# Patient Record
Sex: FEMALE | Race: WHITE | NOT HISPANIC OR LATINO | Employment: OTHER | URBAN - METROPOLITAN AREA
[De-identification: names, ages, dates, MRNs, and addresses within clinical notes are randomized per-mention and may not be internally consistent; named-entity substitution may affect disease eponyms.]

---

## 2023-03-21 LAB
ALANINE AMINOTRANSFERASE (SGPT) (U/L) IN SER/PLAS: 23 U/L (ref 7–45)
ALBUMIN (G/DL) IN SER/PLAS: 4.6 G/DL (ref 3.4–5)
ALKALINE PHOSPHATASE (U/L) IN SER/PLAS: 84 U/L (ref 33–136)
ANION GAP IN SER/PLAS: 13 MMOL/L (ref 10–20)
APPEARANCE, URINE: NORMAL
ASCORBIC ACID: NORMAL MG/DL
ASPARTATE AMINOTRANSFERASE (SGOT) (U/L) IN SER/PLAS: 26 U/L (ref 9–39)
BASOPHILS (10*3/UL) IN BLOOD BY AUTOMATED COUNT: 0.04 X10E9/L (ref 0–0.1)
BASOPHILS/100 LEUKOCYTES IN BLOOD BY AUTOMATED COUNT: 0.7 % (ref 0–2)
BILIRUBIN TOTAL (MG/DL) IN SER/PLAS: 0.6 MG/DL (ref 0–1.2)
BILIRUBIN, URINE: NORMAL
BLOOD, URINE: NORMAL
CALCIDIOL (25 OH VITAMIN D3) (NG/ML) IN SER/PLAS: 47 NG/ML
CALCIUM (MG/DL) IN SER/PLAS: 10.3 MG/DL (ref 8.6–10.6)
CARBON DIOXIDE, TOTAL (MMOL/L) IN SER/PLAS: 28 MMOL/L (ref 21–32)
CHLORIDE (MMOL/L) IN SER/PLAS: 104 MMOL/L (ref 98–107)
CHOLESTEROL (MG/DL) IN SER/PLAS: 231 MG/DL (ref 0–199)
CHOLESTEROL IN HDL (MG/DL) IN SER/PLAS: 63.9 MG/DL
CHOLESTEROL/HDL RATIO: 3.6
COLOR, URINE: NORMAL
CREATININE (MG/DL) IN SER/PLAS: 0.93 MG/DL (ref 0.5–1.05)
EOSINOPHILS (10*3/UL) IN BLOOD BY AUTOMATED COUNT: 0.08 X10E9/L (ref 0–0.4)
EOSINOPHILS/100 LEUKOCYTES IN BLOOD BY AUTOMATED COUNT: 1.3 % (ref 0–6)
ERYTHROCYTE DISTRIBUTION WIDTH (RATIO) BY AUTOMATED COUNT: 13.9 % (ref 11.5–14.5)
ERYTHROCYTE MEAN CORPUSCULAR HEMOGLOBIN CONCENTRATION (G/DL) BY AUTOMATED: 32 G/DL (ref 32–36)
ERYTHROCYTE MEAN CORPUSCULAR VOLUME (FL) BY AUTOMATED COUNT: 87 FL (ref 80–100)
ERYTHROCYTES (10*6/UL) IN BLOOD BY AUTOMATED COUNT: 5.29 X10E12/L (ref 4–5.2)
GFR FEMALE: 64 ML/MIN/1.73M2
GLUCOSE (MG/DL) IN SER/PLAS: 88 MG/DL (ref 74–99)
GLUCOSE, URINE: NORMAL
HEMATOCRIT (%) IN BLOOD BY AUTOMATED COUNT: 46.2 % (ref 36–46)
HEMOGLOBIN (G/DL) IN BLOOD: 14.8 G/DL (ref 12–16)
IMMATURE GRANULOCYTES/100 LEUKOCYTES IN BLOOD BY AUTOMATED COUNT: 0.2 % (ref 0–0.9)
KETONES, URINE: NORMAL
LDL: 150 MG/DL (ref 0–99)
LEUKOCYTE ESTERASE, URINE: NORMAL
LEUKOCYTES (10*3/UL) IN BLOOD BY AUTOMATED COUNT: 6 X10E9/L (ref 4.4–11.3)
LYMPHOCYTES (10*3/UL) IN BLOOD BY AUTOMATED COUNT: 1.23 X10E9/L (ref 0.8–3)
LYMPHOCYTES/100 LEUKOCYTES IN BLOOD BY AUTOMATED COUNT: 20.6 % (ref 13–44)
MONOCYTES (10*3/UL) IN BLOOD BY AUTOMATED COUNT: 0.63 X10E9/L (ref 0.05–0.8)
MONOCYTES/100 LEUKOCYTES IN BLOOD BY AUTOMATED COUNT: 10.6 % (ref 2–10)
NEUTROPHILS (10*3/UL) IN BLOOD BY AUTOMATED COUNT: 3.97 X10E9/L (ref 1.6–5.5)
NEUTROPHILS/100 LEUKOCYTES IN BLOOD BY AUTOMATED COUNT: 66.6 % (ref 40–80)
NITRITE, URINE: NORMAL
NRBC (PER 100 WBCS) BY AUTOMATED COUNT: 0 /100 WBC (ref 0–0)
PH, URINE: NORMAL
PLATELETS (10*3/UL) IN BLOOD AUTOMATED COUNT: 237 X10E9/L (ref 150–450)
POTASSIUM (MMOL/L) IN SER/PLAS: 4.4 MMOL/L (ref 3.5–5.3)
PROTEIN TOTAL: 7.1 G/DL (ref 6.4–8.2)
PROTEIN, URINE: NORMAL
SODIUM (MMOL/L) IN SER/PLAS: 141 MMOL/L (ref 136–145)
SPECIFIC GRAVITY, URINE: NORMAL
THYROTROPIN (MIU/L) IN SER/PLAS BY DETECTION LIMIT <= 0.05 MIU/L: 1.88 MIU/L (ref 0.44–3.98)
TRIGLYCERIDE (MG/DL) IN SER/PLAS: 85 MG/DL (ref 0–149)
UREA NITROGEN (MG/DL) IN SER/PLAS: 16 MG/DL (ref 6–23)
UROBILINOGEN, URINE: NORMAL
VLDL: 17 MG/DL (ref 0–40)

## 2023-07-21 LAB
ALANINE AMINOTRANSFERASE (SGPT) (U/L) IN SER/PLAS: 20 U/L (ref 7–45)
ALBUMIN (G/DL) IN SER/PLAS: 4.7 G/DL (ref 3.4–5)
ALKALINE PHOSPHATASE (U/L) IN SER/PLAS: 72 U/L (ref 33–136)
ANION GAP IN SER/PLAS: 18 MMOL/L (ref 10–20)
ASPARTATE AMINOTRANSFERASE (SGOT) (U/L) IN SER/PLAS: 27 U/L (ref 9–39)
BILIRUBIN TOTAL (MG/DL) IN SER/PLAS: 0.5 MG/DL (ref 0–1.2)
CALCIDIOL (25 OH VITAMIN D3) (NG/ML) IN SER/PLAS: 48 NG/ML
CALCIUM (MG/DL) IN SER/PLAS: 10.2 MG/DL (ref 8.6–10.6)
CARBON DIOXIDE, TOTAL (MMOL/L) IN SER/PLAS: 27 MMOL/L (ref 21–32)
CHLORIDE (MMOL/L) IN SER/PLAS: 102 MMOL/L (ref 98–107)
CHOLESTEROL (MG/DL) IN SER/PLAS: 205 MG/DL (ref 0–199)
CHOLESTEROL IN HDL (MG/DL) IN SER/PLAS: 61 MG/DL
CHOLESTEROL/HDL RATIO: 3.4
CREATININE (MG/DL) IN SER/PLAS: 0.85 MG/DL (ref 0.5–1.05)
GFR FEMALE: 71 ML/MIN/1.73M2
GLUCOSE (MG/DL) IN SER/PLAS: 69 MG/DL (ref 74–99)
LDL: 128 MG/DL (ref 0–99)
POTASSIUM (MMOL/L) IN SER/PLAS: 4.6 MMOL/L (ref 3.5–5.3)
PROTEIN TOTAL: 6.8 G/DL (ref 6.4–8.2)
SODIUM (MMOL/L) IN SER/PLAS: 142 MMOL/L (ref 136–145)
THYROTROPIN (MIU/L) IN SER/PLAS BY DETECTION LIMIT <= 0.05 MIU/L: 2.31 MIU/L (ref 0.44–3.98)
TRIGLYCERIDE (MG/DL) IN SER/PLAS: 82 MG/DL (ref 0–149)
UREA NITROGEN (MG/DL) IN SER/PLAS: 20 MG/DL (ref 6–23)
VLDL: 16 MG/DL (ref 0–40)

## 2023-11-17 ENCOUNTER — OFFICE VISIT (OUTPATIENT)
Dept: PRIMARY CARE | Facility: CLINIC | Age: 73
End: 2023-11-17
Payer: MEDICARE

## 2023-11-17 ENCOUNTER — LAB (OUTPATIENT)
Dept: LAB | Facility: LAB | Age: 73
End: 2023-11-17
Payer: MEDICARE

## 2023-11-17 VITALS
WEIGHT: 166 LBS | SYSTOLIC BLOOD PRESSURE: 118 MMHG | RESPIRATION RATE: 16 BRPM | BODY MASS INDEX: 28.34 KG/M2 | DIASTOLIC BLOOD PRESSURE: 74 MMHG | HEIGHT: 64 IN

## 2023-11-17 DIAGNOSIS — E78.00 ELEVATED LDL CHOLESTEROL LEVEL: ICD-10-CM

## 2023-11-17 DIAGNOSIS — R82.90 ABNORMAL URINE FINDINGS: Primary | ICD-10-CM

## 2023-11-17 DIAGNOSIS — E55.9 VITAMIN D DEFICIENCY: ICD-10-CM

## 2023-11-17 DIAGNOSIS — E78.00 ELEVATED LDL CHOLESTEROL LEVEL: Primary | ICD-10-CM

## 2023-11-17 LAB
25(OH)D3 SERPL-MCNC: 61 NG/ML (ref 30–100)
ALBUMIN SERPL BCP-MCNC: 4.5 G/DL (ref 3.4–5)
ALP SERPL-CCNC: 77 U/L (ref 33–136)
ALT SERPL W P-5'-P-CCNC: 19 U/L (ref 7–45)
ANION GAP SERPL CALC-SCNC: 14 MMOL/L (ref 10–20)
APPEARANCE UR: ABNORMAL
AST SERPL W P-5'-P-CCNC: 20 U/L (ref 9–39)
BILIRUB SERPL-MCNC: 0.5 MG/DL (ref 0–1.2)
BILIRUB UR STRIP.AUTO-MCNC: NEGATIVE MG/DL
BUN SERPL-MCNC: 18 MG/DL (ref 6–23)
CALCIUM SERPL-MCNC: 10.3 MG/DL (ref 8.6–10.6)
CHLORIDE SERPL-SCNC: 104 MMOL/L (ref 98–107)
CHOLEST SERPL-MCNC: 216 MG/DL (ref 0–199)
CHOLESTEROL/HDL RATIO: 3.8
CO2 SERPL-SCNC: 28 MMOL/L (ref 21–32)
COLOR UR: YELLOW
CREAT SERPL-MCNC: 0.93 MG/DL (ref 0.5–1.05)
GFR SERPL CREATININE-BSD FRML MDRD: 64 ML/MIN/1.73M*2
GLUCOSE SERPL-MCNC: 90 MG/DL (ref 74–99)
GLUCOSE UR STRIP.AUTO-MCNC: NEGATIVE MG/DL
HDLC SERPL-MCNC: 56.3 MG/DL
KETONES UR STRIP.AUTO-MCNC: NEGATIVE MG/DL
LDLC SERPL CALC-MCNC: 144 MG/DL
LEUKOCYTE ESTERASE UR QL STRIP.AUTO: ABNORMAL
MUCOUS THREADS #/AREA URNS AUTO: ABNORMAL /LPF
NITRITE UR QL STRIP.AUTO: NEGATIVE
NON HDL CHOLESTEROL: 160 MG/DL (ref 0–149)
PH UR STRIP.AUTO: 7 [PH]
POTASSIUM SERPL-SCNC: 4.2 MMOL/L (ref 3.5–5.3)
PROT SERPL-MCNC: 7.1 G/DL (ref 6.4–8.2)
PROT UR STRIP.AUTO-MCNC: NEGATIVE MG/DL
RBC # UR STRIP.AUTO: NEGATIVE /UL
RBC #/AREA URNS AUTO: ABNORMAL /HPF
SODIUM SERPL-SCNC: 142 MMOL/L (ref 136–145)
SP GR UR STRIP.AUTO: 1.02
SQUAMOUS #/AREA URNS AUTO: ABNORMAL /HPF
TRIGL SERPL-MCNC: 81 MG/DL (ref 0–149)
UROBILINOGEN UR STRIP.AUTO-MCNC: <2 MG/DL
VLDL: 16 MG/DL (ref 0–40)
WBC #/AREA URNS AUTO: ABNORMAL /HPF

## 2023-11-17 PROCEDURE — 36415 COLL VENOUS BLD VENIPUNCTURE: CPT

## 2023-11-17 PROCEDURE — 99214 OFFICE O/P EST MOD 30 MIN: CPT | Performed by: INTERNAL MEDICINE

## 2023-11-17 PROCEDURE — 80061 LIPID PANEL: CPT

## 2023-11-17 PROCEDURE — 1159F MED LIST DOCD IN RCRD: CPT | Performed by: INTERNAL MEDICINE

## 2023-11-17 PROCEDURE — 81001 URINALYSIS AUTO W/SCOPE: CPT

## 2023-11-17 PROCEDURE — 82306 VITAMIN D 25 HYDROXY: CPT

## 2023-11-17 PROCEDURE — 80053 COMPREHEN METABOLIC PANEL: CPT

## 2023-11-17 NOTE — PROGRESS NOTES
Subjective   Patient ID: Yumiko Camilo is a 73 y.o. female who presents for Follow-up.    HPI  Patient in for a visit  Scheduled pre production June July ,the writer in [place] and other in [place] scripts keep changing [employer]  [employer] in August ,   Optivia all natural made by a doctor, vitamins daily ,has lost 31 lbs at home startedat 199 lbs  Review of Systems  General: Denies fever, chills, night sweats,  Eyes: Negative for recent visual changes  Ears, Nose, Throat :  Negative for hearing changes, sinus discomfort  Dermatologic: Negative for new skin conditions, rash  Respiratory: Negative for wheezing, shortness of breath, cough  Cardiovascular: Negative for chest pain, palpitations, or leg swelling  Gastrointestinal: Negative for nausea/vomiting, abdominal pain, changes in bowel habits  Genitourinary NEGATIVE FOR URINARY INCONTINENCE urgency , frequency, discomfort   Musculoskeletal: see hpi  Neurological: Negative for headaches, dizziness    Previous history  Past Medical History:   Diagnosis Date    Asymptomatic menopausal state     Asymptomatic menopausal state    Cellulitis, unspecified 02/09/2016    Cellulitis    Contact with and (suspected) exposure to other hazardous, chiefly nonmedicinal, chemicals     Exposure to chemical irritant    Disorder of external ear, unspecified, unspecified ear 03/07/2022    Disorder of ear lobe    Disorder of the skin and subcutaneous tissue, unspecified     Foot lesion    Encounter for general adult medical examination without abnormal findings 03/03/2022    Encounter for Medicare annual wellness exam    Encounter for gynecological examination (general) (routine) without abnormal findings 04/25/2016    Well woman exam    Encounter for immunization 09/16/2020    Needs flu shot    Encounter for screening for malignant neoplasm of cervix 04/25/2016    Screening for cervical cancer    Fall (on)(from) sidewalk curb, initial encounter     Fall on or from sidewalk curb    Fracture of  unspecified part of left clavicle, initial encounter for closed fracture     Closed left clavicular fracture    Mammographic calcification found on diagnostic imaging of breast 2015    Breast calcification, right    Mixed hyperlipidemia 2022    Combined hyperlipidemia    Obesity, unspecified 2021    Obesity    Other abnormal and inconclusive findings on diagnostic imaging of breast 2015    Mammogram abnormal    Other conditions influencing health status 2016    Postoperative seroma    Other injury of unspecified body region, initial encounter 2022    Wound of skin    Other specified disorders of bone density and structure, unspecified site 2021    Osteopenia    Other specified personal risk factors, not elsewhere classified 2017    History of exposure    Pain in left shoulder 2021    Left shoulder pain    Personal history of (healed) traumatic fracture     History of fracture of toe    Personal history of malignant neoplasm of breast 2016    History of malignant neoplasm of breast    Personal history of other specified conditions 2017    History of fatigue    Personal history of traumatic brain injury     History of concussion    Post-traumatic stress disorder, unspecified     PTSD (post-traumatic stress disorder)    Unspecified contact dermatitis due to other chemical products 2018    Latex allergy, contact dermatitis    Unspecified contact dermatitis, unspecified cause 2022    Contact dermatitis     Past Surgical History:   Procedure Laterality Date    BREAST BIOPSY  2018    Biopsy Breast Open    CATARACT EXTRACTION  2018    Cataract Surgery    MASTECTOMY, PARTIAL  2018    Right Breast Partial Mastectomy    MOUTH SURGERY  2018    Oral Surgery Tooth Extraction    OTHER SURGICAL HISTORY  2018    Surgically Induced  - By Dilation And Evacuation    SENTINEL LYMPH NODE BIOPSY  2018    [place] Lymph  Node Biopsy        No family history on file.  Not on File  No current outpatient medications    Objective       Physical Exam    Vital Signs: as recorded above  General: Well groomed, well nourished   Orientation:  Alert , oriented to time, place , and person   Mood and Affect:  Cooperative , no apparent distress normal affect  Skin: Good color, good turgor  Eyes: Extra ocular muscle movements intact, anicteric sclerae  Neck: Supple, full range of movement  Chest: Normal breath sounds, normal chest wall exam, symmetric, good air entry, clear to auscultation  Heart: Regular rate and rhythm, without murmur, gallop, or rubs  BACK:  no CTLS spine tenderness, no flank tenderness  Extremities: full range of movement  bilateral UE and bilateral LE,  no lower extremity edema  Neurological: Alert, oriented, cranial nerves II-XII intact except for visual acuity  Sensation:  Intact   Gait: normal steady      Assessment/Plan   Yumiko Camilo is a 73 y.o. female who presents for the concerns below:    Problem List Items Addressed This Visit    None    HYPERCHOLESTEROLEMIA PLAN:  will recheck with the weight loss  continue current medications  Follow low cholesterol diet, encouraged high omega 3 fatty acid intake in diet, exercise, weight control. . Will Obtain AST/ALT, fasting lipid profile, creatine kinase  on statin if not done within past 3-6 months . Coenzyme Q10 200 mg a day if able to help increase HDL.    Bmi 28     VITAMIN D DEFICIENCY low levels ,will put in order for high dose weekly 50,000 IU , then take Vitamin D3 2000 iu daily after 3 month course,   recheck vitamin D level in 4-5 months if below 26 will restart high dose again for another course , discussed other supplements or medications that may go through liver  follow-up as planned         Discussed with:   Return in : 6 months sooner pending lab results     Portions of this note were generated using digital voice recognition software, and may contain  grammatical errors       Babar Luna MD  11/17/23  9:34 AM

## 2023-11-20 ENCOUNTER — TELEPHONE (OUTPATIENT)
Dept: PRIMARY CARE | Facility: CLINIC | Age: 73
End: 2023-11-20
Payer: MEDICARE

## 2023-11-20 DIAGNOSIS — E78.5 HYPERLIPIDEMIA, UNSPECIFIED HYPERLIPIDEMIA TYPE: Primary | ICD-10-CM

## 2023-11-20 NOTE — TELEPHONE ENCOUNTER
Patient called and wanted to discuss her abnormal lab tests shital on 11/17      Spoke with pt no urinary sxs  Stricter lower fat diet , switch to almond or oat milk  Recheck in two months

## 2023-11-22 ENCOUNTER — LAB (OUTPATIENT)
Dept: LAB | Facility: LAB | Age: 73
End: 2023-11-22
Payer: MEDICARE

## 2023-11-22 ENCOUNTER — TELEPHONE (OUTPATIENT)
Dept: PRIMARY CARE | Facility: CLINIC | Age: 73
End: 2023-11-22

## 2023-11-22 DIAGNOSIS — N30.00 ACUTE CYSTITIS WITHOUT HEMATURIA: Primary | ICD-10-CM

## 2023-11-22 DIAGNOSIS — R82.90 ABNORMAL URINE FINDINGS: ICD-10-CM

## 2023-11-22 PROCEDURE — 87086 URINE CULTURE/COLONY COUNT: CPT

## 2023-11-22 PROCEDURE — 87186 SC STD MICRODIL/AGAR DIL: CPT

## 2023-11-22 NOTE — TELEPHONE ENCOUNTER
Pt worried about UC results.  She went this morning to give UC sample.  I told her that results will not be back for at least 24 hours.  Pharmaceutical study wont be back until Friday.  Pt has never had UTI before.  She only has mild sxs (she is urinating 2 more time during the day).    When going to lab there are blood tests ordered for Pt.  She was confused.  She didn't do blood tests and called us.  I told her those blood test are for her next appt.  LG

## 2023-11-25 LAB — BACTERIA UR CULT: ABNORMAL

## 2023-11-27 RX ORDER — CIPROFLOXACIN 500 MG/1
500 TABLET ORAL 2 TIMES DAILY
Qty: 14 TABLET | Refills: 0 | Status: SHIPPED | OUTPATIENT
Start: 2023-11-27 | End: 2023-12-04

## 2023-11-28 ENCOUNTER — TELEPHONE (OUTPATIENT)
Dept: PRIMARY CARE | Facility: CLINIC | Age: 73
End: 2023-11-28
Payer: MEDICARE

## 2023-11-28 NOTE — TELEPHONE ENCOUNTER
Pt left message very worried about taking cipro after reading the side effects.  Called Pt back and told her that the side effects were rare and reassured her that she was only on a 7 day dose.  Advised Pt to take cipro if any sxs appear and to call office.  LG asked a couple of female coworkers if they had experience with cipro (yes) and if they had any side effects (no).  Relayed info to Pt.  Pt was very relieved of the info and very glad for the call back.  KATARZYNA

## 2023-12-05 ENCOUNTER — TELEPHONE (OUTPATIENT)
Dept: PRIMARY CARE | Facility: CLINIC | Age: 73
End: 2023-12-05
Payer: MEDICARE

## 2023-12-05 DIAGNOSIS — N30.00 ACUTE CYSTITIS WITHOUT HEMATURIA: Primary | ICD-10-CM

## 2023-12-05 NOTE — TELEPHONE ENCOUNTER
Pt has had last dose of cipro on Mon 12/4.  She will go to lab for FU urine culture on Monday 12/11.  Please authorize UC order.  LG

## 2023-12-11 ENCOUNTER — LAB (OUTPATIENT)
Dept: LAB | Facility: LAB | Age: 73
End: 2023-12-11
Payer: MEDICARE

## 2023-12-11 DIAGNOSIS — N30.00 ACUTE CYSTITIS WITHOUT HEMATURIA: ICD-10-CM

## 2023-12-11 PROCEDURE — 87086 URINE CULTURE/COLONY COUNT: CPT

## 2023-12-12 LAB — BACTERIA UR CULT: NORMAL

## 2023-12-13 ENCOUNTER — TELEPHONE (OUTPATIENT)
Dept: PRIMARY CARE | Facility: CLINIC | Age: 73
End: 2023-12-13
Payer: MEDICARE

## 2023-12-13 NOTE — TELEPHONE ENCOUNTER
"Pt saw the latest UC results.  Test is clean. \"Yea Cipro!\".  She would like to know if there is anything else she needs to do?  LG to call back.  LG  "

## 2023-12-19 ENCOUNTER — HOSPITAL ENCOUNTER (OUTPATIENT)
Dept: RADIATION ONCOLOGY | Facility: HOSPITAL | Age: 73
Setting detail: RADIATION/ONCOLOGY SERIES
Discharge: HOME | End: 2023-12-19
Payer: MEDICARE

## 2023-12-19 VITALS
TEMPERATURE: 96.8 F | SYSTOLIC BLOOD PRESSURE: 133 MMHG | DIASTOLIC BLOOD PRESSURE: 89 MMHG | HEART RATE: 120 BPM | WEIGHT: 176.4 LBS | BODY MASS INDEX: 30.11 KG/M2 | RESPIRATION RATE: 18 BRPM | HEIGHT: 64 IN | OXYGEN SATURATION: 94 %

## 2023-12-19 DIAGNOSIS — C50.911 MALIGNANT NEOPLASM OF RIGHT BREAST IN FEMALE, ESTROGEN RECEPTOR POSITIVE, UNSPECIFIED SITE OF BREAST (MULTI): Primary | ICD-10-CM

## 2023-12-19 DIAGNOSIS — Z17.0 MALIGNANT NEOPLASM OF RIGHT BREAST IN FEMALE, ESTROGEN RECEPTOR POSITIVE, UNSPECIFIED SITE OF BREAST (MULTI): Primary | ICD-10-CM

## 2023-12-19 PROCEDURE — 99213 OFFICE O/P EST LOW 20 MIN: CPT | Mod: PO | Performed by: NURSE PRACTITIONER

## 2023-12-19 PROCEDURE — 99213 OFFICE O/P EST LOW 20 MIN: CPT | Performed by: NURSE PRACTITIONER

## 2023-12-19 RX ORDER — LISINOPRIL 10 MG/1
10 TABLET ORAL DAILY
COMMUNITY
End: 2024-04-15 | Stop reason: SDUPTHER

## 2023-12-19 ASSESSMENT — ENCOUNTER SYMPTOMS
DEPRESSION: 0
LOSS OF SENSATION IN FEET: 0
OCCASIONAL FEELINGS OF UNSTEADINESS: 0

## 2023-12-19 ASSESSMENT — COLUMBIA-SUICIDE SEVERITY RATING SCALE - C-SSRS
1. IN THE PAST MONTH, HAVE YOU WISHED YOU WERE DEAD OR WISHED YOU COULD GO TO SLEEP AND NOT WAKE UP?: NO
2. HAVE YOU ACTUALLY HAD ANY THOUGHTS OF KILLING YOURSELF?: NO
6. HAVE YOU EVER DONE ANYTHING, STARTED TO DO ANYTHING, OR PREPARED TO DO ANYTHING TO END YOUR LIFE?: NO

## 2023-12-19 ASSESSMENT — PATIENT HEALTH QUESTIONNAIRE - PHQ9
1. LITTLE INTEREST OR PLEASURE IN DOING THINGS: NOT AT ALL
SUM OF ALL RESPONSES TO PHQ9 QUESTIONS 1 AND 2: 0
2. FEELING DOWN, DEPRESSED OR HOPELESS: NOT AT ALL

## 2023-12-19 NOTE — PROGRESS NOTES
"Radiation Oncology Follow-Up    Patient Name:  Yumiko Camilo  MRN:  [id number]  :  1950    Referring Provider: No ref. provider found  Primary Care Provider: Babar Luna MD  Care Team: Patient Care Team:  Babar Luna MD as PCP - General  Babar Luna MD as PCP - [facility]ributed Provider    Date of Service: 2023     Cancer Staging:   Treatment Synopsis:    73-year-old female with a history of T1b N0 M0 invasive lobular carcinoma of the right breast, estrogen receptor positive, progesterone receptor positive,  and HER-2 negative. She is status post partial mastectomy with clear margins and sentinel lymph node biopsy. Following surgery, she received radiation therapy to the whole breast consisting of a dose of 42.56 Gy given in 16 fractions, completed on 2016. She was initiated on anastrozole hormonal therapy post treatment; unable to tolerate anastrozole due to cognitive dysfunction and started on exemestane .  SUBJECTIVE  History of Present Illness:   Yumiko Camilo is here today for routine radiation follow up/surveillance visit.  She is doing very well and has been working with a  3 x's per week. She denies any palpable findings on breast self exam.  No swelling of right arm or difficulty with ROM.  No headaches, fever, chills, cough, SOB, chest pain or bony pain.  Energy level and appetite are good.  Mammogram is due in March.     Review of Systems:    Review of Systems   All other systems reviewed and are negative.    Performance Status:   The Karnofsky performance scale today is 100, Fully active, able to carry on all pre-disease performed without restriction (ECOG equivalent 0).      OBJECTIVE  Vital Signs:  /89   Pulse (!) 120   Temp 36 °C (96.8 °F) (Temporal)   Resp 18   Ht 1.626 m (5' 4\")   Wt 80 kg (176 lb 6.4 oz)   SpO2 94%   BMI 30.28 kg/m²      Current Outpatient Medications:    "  lisinopril 10 mg tablet, Take 1 tablet (10 mg) by mouth once daily., Disp: , Rfl:      Physical Exam  Constitutional:       Appearance: Normal appearance.   HENT:      Head: Normocephalic and atraumatic.      Nose: Nose normal.      Mouth/Throat:      Mouth: Mucous membranes are moist.      Pharynx: Oropharynx is clear.   Eyes:      Conjunctiva/sclera: Conjunctivae normal.      Pupils: Pupils are equal, round, and reactive to light.   Cardiovascular:      Rate and Rhythm: Normal rate.      Heart sounds: Normal heart sounds.   Pulmonary:      Effort: Pulmonary effort is normal.      Breath sounds: Normal breath sounds.   Chest:   Breasts:     Right: Normal. No swelling, inverted nipple, mass, nipple discharge or skin change.      Left: Normal. No swelling, inverted nipple, mass, nipple discharge or skin change.   Abdominal:      Palpations: Abdomen is soft.   Musculoskeletal:         General: No swelling. Normal range of motion.      Cervical back: Normal range of motion.   Lymphadenopathy:      Cervical: No cervical adenopathy.      Upper Body:      Right upper body: No supraclavicular or axillary adenopathy.      Left upper body: No supraclavicular or axillary adenopathy.   Skin:     General: Skin is warm and dry.   Neurological:      General: No focal deficit present.      Mental Status: She is alert and oriented to person, place, and time.   Psychiatric:         Mood and Affect: Mood normal.         Behavior: Behavior normal.         ASSESSMENT/PLAN:  73 y.o. female with early stage right breast cancer s/p breast conserving surgery followed by radiation.  She is clinically and radiographically without evidence of  recurrence. Cosmetic outcome is excellent.  Mammogram in March.  I will see her for routine follow in 6 mo.  She will call me with any concerns.       Mayra Sprague CNP  [phone]

## 2024-03-25 ENCOUNTER — HOSPITAL ENCOUNTER (OUTPATIENT)
Dept: RADIOLOGY | Facility: HOSPITAL | Age: 74
Discharge: HOME | End: 2024-03-25
Payer: MEDICARE

## 2024-03-25 VITALS — HEIGHT: 64 IN | WEIGHT: 165 LBS | BODY MASS INDEX: 28.17 KG/M2

## 2024-03-25 DIAGNOSIS — C50.919 MALIGNANT NEOPLASM OF UNSPECIFIED SITE OF UNSPECIFIED FEMALE BREAST (MULTI): ICD-10-CM

## 2024-03-25 PROCEDURE — 77066 DX MAMMO INCL CAD BI: CPT | Performed by: STUDENT IN AN ORGANIZED HEALTH CARE EDUCATION/TRAINING PROGRAM

## 2024-03-25 PROCEDURE — 77062 BREAST TOMOSYNTHESIS BI: CPT

## 2024-03-25 PROCEDURE — G0279 TOMOSYNTHESIS, MAMMO: HCPCS | Performed by: STUDENT IN AN ORGANIZED HEALTH CARE EDUCATION/TRAINING PROGRAM

## 2024-03-27 ENCOUNTER — APPOINTMENT (OUTPATIENT)
Dept: RADIATION ONCOLOGY | Facility: HOSPITAL | Age: 74
End: 2024-03-27
Payer: MEDICARE

## 2024-04-15 ENCOUNTER — TELEPHONE (OUTPATIENT)
Dept: RHEUMATOLOGY | Facility: CLINIC | Age: 74
End: 2024-04-15
Payer: MEDICARE

## 2024-04-15 DIAGNOSIS — I10 HYPERTENSION, UNSPECIFIED TYPE: Primary | ICD-10-CM

## 2024-04-15 DIAGNOSIS — I10 HYPERTENSION, UNSPECIFIED TYPE: ICD-10-CM

## 2024-04-15 RX ORDER — LISINOPRIL 10 MG/1
10 TABLET ORAL DAILY
Qty: 90 TABLET | Refills: 0 | Status: SHIPPED | OUTPATIENT
Start: 2024-04-15 | End: 2024-04-16

## 2024-04-15 NOTE — TELEPHONE ENCOUNTER
Patient called for a refill on     Lisinopril 10mg tablets #90 one tablet daily      [facility] [place] [phone]

## 2024-04-15 NOTE — TELEPHONE ENCOUNTER
Patient called for a refill on    Lisinopril 10mg tablets #90 one tablet daily     [facility] [place] [phone]

## 2024-04-16 RX ORDER — LISINOPRIL 10 MG/1
10 TABLET ORAL DAILY
Qty: 90 TABLET | Refills: 1 | Status: SHIPPED | OUTPATIENT
Start: 2024-04-16

## 2024-05-17 ENCOUNTER — LAB (OUTPATIENT)
Dept: LAB | Facility: LAB | Age: 74
End: 2024-05-17
Payer: MEDICARE

## 2024-05-17 ENCOUNTER — OFFICE VISIT (OUTPATIENT)
Dept: PRIMARY CARE | Facility: CLINIC | Age: 74
End: 2024-05-17
Payer: MEDICARE

## 2024-05-17 VITALS
DIASTOLIC BLOOD PRESSURE: 82 MMHG | HEIGHT: 64 IN | SYSTOLIC BLOOD PRESSURE: 128 MMHG | WEIGHT: 172 LBS | BODY MASS INDEX: 29.37 KG/M2

## 2024-05-17 DIAGNOSIS — R79.9 ABNORMAL BLOOD CHEMISTRY: ICD-10-CM

## 2024-05-17 DIAGNOSIS — E78.00 ELEVATED LDL CHOLESTEROL LEVEL: ICD-10-CM

## 2024-05-17 DIAGNOSIS — Z78.0 ASYMPTOMATIC MENOPAUSAL STATE: ICD-10-CM

## 2024-05-17 DIAGNOSIS — I10 HYPERTENSION, UNSPECIFIED TYPE: ICD-10-CM

## 2024-05-17 DIAGNOSIS — Z01.419 ENCOUNTER FOR GYNECOLOGICAL EXAMINATION: ICD-10-CM

## 2024-05-17 DIAGNOSIS — Z00.00 MEDICARE ANNUAL WELLNESS VISIT, SUBSEQUENT: Primary | ICD-10-CM

## 2024-05-17 DIAGNOSIS — E66.3 OVERWEIGHT: ICD-10-CM

## 2024-05-17 DIAGNOSIS — Z00.00 ROUTINE GENERAL MEDICAL EXAMINATION AT HEALTH CARE FACILITY: ICD-10-CM

## 2024-05-17 DIAGNOSIS — Z12.11 SCREENING FOR MALIGNANT NEOPLASM OF COLON: ICD-10-CM

## 2024-05-17 LAB
ALBUMIN SERPL BCP-MCNC: 4.4 G/DL (ref 3.4–5)
ALP SERPL-CCNC: 75 U/L (ref 33–136)
ALT SERPL W P-5'-P-CCNC: 23 U/L (ref 7–45)
ANION GAP SERPL CALC-SCNC: 13 MMOL/L (ref 10–20)
APPEARANCE UR: CLEAR
AST SERPL W P-5'-P-CCNC: 23 U/L (ref 9–39)
BASOPHILS # BLD AUTO: 0.05 X10*3/UL (ref 0–0.1)
BASOPHILS NFR BLD AUTO: 1 %
BILIRUB SERPL-MCNC: 0.4 MG/DL (ref 0–1.2)
BILIRUB UR STRIP.AUTO-MCNC: NEGATIVE MG/DL
BUN SERPL-MCNC: 18 MG/DL (ref 6–23)
CALCIUM SERPL-MCNC: 9.8 MG/DL (ref 8.6–10.6)
CHLORIDE SERPL-SCNC: 105 MMOL/L (ref 98–107)
CO2 SERPL-SCNC: 30 MMOL/L (ref 21–32)
COLOR UR: ABNORMAL
CREAT SERPL-MCNC: 0.79 MG/DL (ref 0.5–1.05)
CREAT UR-MCNC: 100 MG/DL (ref 20–320)
EGFRCR SERPLBLD CKD-EPI 2021: 78 ML/MIN/1.73M*2
EOSINOPHIL # BLD AUTO: 0.13 X10*3/UL (ref 0–0.4)
EOSINOPHIL NFR BLD AUTO: 2.6 %
ERYTHROCYTE [DISTWIDTH] IN BLOOD BY AUTOMATED COUNT: 14.6 % (ref 11.5–14.5)
EST. AVERAGE GLUCOSE BLD GHB EST-MCNC: 105 MG/DL
GLUCOSE SERPL-MCNC: 91 MG/DL (ref 74–99)
GLUCOSE UR STRIP.AUTO-MCNC: NORMAL MG/DL
HBA1C MFR BLD: 5.3 %
HCT VFR BLD AUTO: 46 % (ref 36–46)
HGB BLD-MCNC: 14.5 G/DL (ref 12–16)
IMM GRANULOCYTES # BLD AUTO: 0.01 X10*3/UL (ref 0–0.5)
IMM GRANULOCYTES NFR BLD AUTO: 0.2 % (ref 0–0.9)
KETONES UR STRIP.AUTO-MCNC: NEGATIVE MG/DL
LEUKOCYTE ESTERASE UR QL STRIP.AUTO: ABNORMAL
LYMPHOCYTES # BLD AUTO: 1.14 X10*3/UL (ref 0.8–3)
LYMPHOCYTES NFR BLD AUTO: 22.8 %
MCH RBC QN AUTO: 28.3 PG (ref 26–34)
MCHC RBC AUTO-ENTMCNC: 31.5 G/DL (ref 32–36)
MCV RBC AUTO: 90 FL (ref 80–100)
MICROALBUMIN UR-MCNC: 7.3 MG/L
MICROALBUMIN/CREAT UR: 7.3 UG/MG CREAT
MONOCYTES # BLD AUTO: 0.52 X10*3/UL (ref 0.05–0.8)
MONOCYTES NFR BLD AUTO: 10.4 %
NEUTROPHILS # BLD AUTO: 3.14 X10*3/UL (ref 1.6–5.5)
NEUTROPHILS NFR BLD AUTO: 63 %
NITRITE UR QL STRIP.AUTO: NEGATIVE
NRBC BLD-RTO: 0 /100 WBCS (ref 0–0)
PH UR STRIP.AUTO: 7 [PH]
PLATELET # BLD AUTO: 249 X10*3/UL (ref 150–450)
POTASSIUM SERPL-SCNC: 4.6 MMOL/L (ref 3.5–5.3)
PROT SERPL-MCNC: 6.8 G/DL (ref 6.4–8.2)
PROT UR STRIP.AUTO-MCNC: NEGATIVE MG/DL
RBC # BLD AUTO: 5.13 X10*6/UL (ref 4–5.2)
RBC # UR STRIP.AUTO: NEGATIVE /UL
RBC #/AREA URNS AUTO: ABNORMAL /HPF
SODIUM SERPL-SCNC: 143 MMOL/L (ref 136–145)
SP GR UR STRIP.AUTO: 1.02
SQUAMOUS #/AREA URNS AUTO: ABNORMAL /HPF
TSH SERPL-ACNC: 2.17 MIU/L (ref 0.44–3.98)
UROBILINOGEN UR STRIP.AUTO-MCNC: NORMAL MG/DL
WBC # BLD AUTO: 5 X10*3/UL (ref 4.4–11.3)
WBC #/AREA URNS AUTO: ABNORMAL /HPF

## 2024-05-17 PROCEDURE — 85025 COMPLETE CBC W/AUTO DIFF WBC: CPT

## 2024-05-17 PROCEDURE — 1036F TOBACCO NON-USER: CPT | Performed by: INTERNAL MEDICINE

## 2024-05-17 PROCEDURE — 3079F DIAST BP 80-89 MM HG: CPT | Performed by: INTERNAL MEDICINE

## 2024-05-17 PROCEDURE — 1159F MED LIST DOCD IN RCRD: CPT | Performed by: INTERNAL MEDICINE

## 2024-05-17 PROCEDURE — 84443 ASSAY THYROID STIM HORMONE: CPT

## 2024-05-17 PROCEDURE — 80053 COMPREHEN METABOLIC PANEL: CPT

## 2024-05-17 PROCEDURE — 99214 OFFICE O/P EST MOD 30 MIN: CPT | Performed by: INTERNAL MEDICINE

## 2024-05-17 PROCEDURE — 83036 HEMOGLOBIN GLYCOSYLATED A1C: CPT

## 2024-05-17 PROCEDURE — 1158F ADVNC CARE PLAN TLK DOCD: CPT | Performed by: INTERNAL MEDICINE

## 2024-05-17 PROCEDURE — 1170F FXNL STATUS ASSESSED: CPT | Performed by: INTERNAL MEDICINE

## 2024-05-17 PROCEDURE — G0439 PPPS, SUBSEQ VISIT: HCPCS | Performed by: INTERNAL MEDICINE

## 2024-05-17 PROCEDURE — 1123F ACP DISCUSS/DSCN MKR DOCD: CPT | Performed by: INTERNAL MEDICINE

## 2024-05-17 PROCEDURE — 81001 URINALYSIS AUTO W/SCOPE: CPT

## 2024-05-17 PROCEDURE — 82043 UR ALBUMIN QUANTITATIVE: CPT

## 2024-05-17 PROCEDURE — 36415 COLL VENOUS BLD VENIPUNCTURE: CPT

## 2024-05-17 PROCEDURE — 82570 ASSAY OF URINE CREATININE: CPT

## 2024-05-17 PROCEDURE — 80061 LIPID PANEL: CPT

## 2024-05-17 PROCEDURE — 3074F SYST BP LT 130 MM HG: CPT | Performed by: INTERNAL MEDICINE

## 2024-05-17 RX ORDER — ACETAMINOPHEN 500 MG
TABLET ORAL
COMMUNITY

## 2024-05-17 RX ORDER — MULTIVITAMIN
TABLET ORAL
COMMUNITY

## 2024-05-17 RX ORDER — GLUCOSAMINE/CHONDR SU A SOD 167-133 MG
CAPSULE ORAL
COMMUNITY

## 2024-05-17 ASSESSMENT — PATIENT HEALTH QUESTIONNAIRE - PHQ9
2. FEELING DOWN, DEPRESSED OR HOPELESS: NOT AT ALL
1. LITTLE INTEREST OR PLEASURE IN DOING THINGS: NOT AT ALL
SUM OF ALL RESPONSES TO PHQ9 QUESTIONS 1 AND 2: 0

## 2024-05-17 ASSESSMENT — ENCOUNTER SYMPTOMS
OCCASIONAL FEELINGS OF UNSTEADINESS: 0
DEPRESSION: 0
LOSS OF SENSATION IN FEET: 0

## 2024-05-17 ASSESSMENT — ACTIVITIES OF DAILY LIVING (ADL)
MANAGING_FINANCES: INDEPENDENT
GROCERY_SHOPPING: INDEPENDENT
DRESSING: INDEPENDENT
DOING_HOUSEWORK: INDEPENDENT
TAKING_MEDICATION: INDEPENDENT
BATHING: INDEPENDENT

## 2024-05-17 NOTE — PROGRESS NOTES
Subjective   Patient ID: Yumiko Camilo is a 73 y.o. female who presents for Medicare Annual Wellness Visit Subsequent.    HPI  Patient in for a visit  Doing strength training three times a week  , can dead lift 150 lbs   Patient comes in for an [facility] Annual Wellness Visit  last one done over a year ago , doing well over-all with no particular complaints. Also is in for laboratory review and health maintenance update.  Updating family history as well.  Interval event - past medical history, surgical, social, and family history reviewed and updated.  Interval care -  Patient is    up to date with dental care.  Patient does     receive routine vision care.  Last mammogram March 2024  Stopped smoking , 10 years  1/2 ppd , and toxic exposure 9/11    Review of Systems  General: Denies fever, chills, night sweats, changes in appetite or weight  ENT: Negative for ear pain, hearing loss, headache, difficulty swallowing, up to date with dental checks   Eyes: Negative for recent visual changes, up to date with eye exams  Dermatologic: Negative for new skin conditions, rash  Respiratory: Negative for paroxysmal nocturnal dyspnea, wheezing,shortness of breath, cough  Cardiovascular: Negative for chest pain, palpitations, or leg swelling  Gastrointestinal: Negative for nausea/vomiting, abdominal pain, changes in bowel habits  Genitourinary: Negative for dysuria, urgency, frequency  URINARY INCONTINENCE   Neurological: Negative for headaches, tremors, dizziness, memory loss, confusion, weakness, paresthesias  Psychiatric: Negative for sleep problems, anxiety, depression, conditions are stable  Endocrine: Negative for heat or cold intolerance, polyuria, polydipsia  Other:All systems have been reviewed and are negative except as previously noted.    Previous history  Past Medical History:   Diagnosis Date    Asymptomatic menopausal state     Asymptomatic menopausal state    Breast cancer (Multi)     Cellulitis,  unspecified 02/09/2016    Cellulitis    Contact with and (suspected) exposure to other hazardous, chiefly nonmedicinal, chemicals     Exposure to chemical irritant    Disorder of external ear, unspecified, unspecified ear 03/07/2022    Disorder of ear lobe    Disorder of the skin and subcutaneous tissue, unspecified     Foot lesion    Encounter for general adult medical examination without abnormal findings 03/03/2022    Encounter for Medicare annual wellness exam    Encounter for gynecological examination (general) (routine) without abnormal findings 04/25/2016    Well woman exam    Encounter for immunization 09/16/2020    Needs flu shot    Encounter for screening for malignant neoplasm of cervix 04/25/2016    Screening for cervical cancer    Fall (on)(from) sidewalk curb, initial encounter     Fall on or from sidewalk curb    Fracture of unspecified part of left clavicle, initial encounter for closed fracture     Closed left clavicular fracture    Mammographic calcification found on diagnostic imaging of breast 12/01/2015    Breast calcification, right    Mixed hyperlipidemia 11/22/2022    Combined hyperlipidemia    Obesity, unspecified 11/04/2021    Obesity    Other abnormal and inconclusive findings on diagnostic imaging of breast 11/30/2015    Mammogram abnormal    Other conditions influencing health status 02/04/2016    Postoperative seroma    Other injury of unspecified body region, initial encounter 03/03/2022    Wound of skin    Other specified disorders of bone density and structure, unspecified site 02/09/2021    Osteopenia    Other specified personal risk factors, not elsewhere classified 01/04/2017    History of exposure    Pain in left shoulder 08/20/2021    Left shoulder pain    Personal history of (healed) traumatic fracture     History of fracture of toe    Personal history of irradiation     Personal history of malignant neoplasm of breast 06/06/2016    History of malignant neoplasm of breast     Personal history of other specified conditions 2017    History of fatigue    Personal history of traumatic brain injury     History of concussion    Post-traumatic stress disorder, unspecified     PTSD (post-traumatic stress disorder)    Unspecified contact dermatitis due to other chemical products 2018    Latex allergy, contact dermatitis    Unspecified contact dermatitis, unspecified cause 2022    Contact dermatitis     Past Surgical History:   Procedure Laterality Date    BREAST LUMPECTOMY Right     CATARACT EXTRACTION  2018    Cataract Surgery    MOUTH SURGERY  2018    Oral Surgery Tooth Extraction    OTHER SURGICAL HISTORY  2018    Surgically Induced  - By Dilation And Evacuation    SENTINEL LYMPH NODE BIOPSY  2018    [place] Lymph Node Biopsy     Social History     Tobacco Use    Smoking status: Former     Types: Cigarettes    Smokeless tobacco: Former   Vaping Use    Vaping status: Never Used   Substance Use Topics    Alcohol use: Yes     Alcohol/week: 1.0 standard drink of alcohol     Types: 1 Glasses of wine per week    Drug use: Never     Family History   Problem Relation Name Age of Onset    Breast cancer Maternal Grandmother  42     Allergies   Allergen Reactions    Bacitracin Zinc-Polymyxin B Hives    Penicillin Other    Adhesive Itching and Rash    Benzalkonium Chloride Rash    Elastin Rash    Latex Rash    Nickel Rash     Current Outpatient Medications   Medication Instructions    CALCIUM CITRATE-VITAMIN D3 ORAL oral    cholecalciferol (Vitamin D-3) 50 mcg (2,000 unit) capsule oral    lisinopril 10 mg, oral, Daily    multivitamin tablet oral    niacin 500 mg tablet oral       Objective       Physical Exam    Vital Signs: as recorded above  General: Well groomed, well nourished   Orientation:  Alert , oriented to time, place , and person   Mood and Affect:  Cooperative , no apparent distress normal affect  Skin: Good color, good turgor  Eyes: Extra  ocular muscle movements intact, anicteric sclerae  Neck: Supple, full range of movement  Chest: Normal breath sounds, normal chest wall exam, symmetric, good air entry, clear to auscultation  Heart: Regular rate and rhythm, without murmur, gallop, or rubs  Abdomen soft nontender no masses felt no hepatosplenomegaly, no rebound or guarding  BACK:  no CTLS spine tenderness, no flank tenderness  Extremities: full range of movement  bilateral UE and bilateral LE,  no lower extremity edema  Neurological: Alert, oriented, cranial nerves II-XII grossly intact except for visual acuity  Sensation:  Intact   Gait: normal steady    Assessment/Plan   Yumiko Camilo is a 73 y.o. female who presents for the concerns below:    Problem List Items Addressed This Visit    None  Previous 2019  cardiac CT test results and your score was excellent at very low at 8.9 at the LAD , checking lipid panel and if cACs score is higher will recommend treatment of high LDL     HYPERTENSION PLAN:  , taking blood pressure medication  Follow low salt diet, exercise as discussed, weight control. Continue current medications    OVERWEIGHT  PLAN: Proper sleep habits, increase water intake, we'll make sure no metabolic problems weight reduction through a low calorie diet and  exercise preferred walking continue weight training .consult with weight loss - endocrinology.  Since with hypertension and elev LDL     Hx of breast cancer  history of T1b N0 M0 invasive lobular carcinoma of the right breast, estrogen receptor positive, progesterone receptor positive, and HER-2 negative. She is status post partial mastectomy with clear margins and sentinel lymph node biopsy.  Seeing [place] Sprague regularly utd with mammogram   Would like her to see gynecology to make sure no other screenings are missed I.e pap test , pelvic exam     HYPERGLYCEMIA PLAN: in the past make sure not diabetic or prediabetic   without  medications currently  , continue to follow  Diabetic diet,  urine microalbumin utd , ophthalmology exam.  Need Podiatry checks periodically.      Annual Wellness Visit  the risks and benefits of influenza vaccination were discussed with the patient, influenza vaccine is up to date this year  the risks and benefits of Prevnar 20  vaccination were discussed with the patient, Prevnar 20 vaccine is    up to date  the risks and benefits of pneumonia vaccination were discussed with the patient, pneumonia vaccine is     up to date   the risks and benefits of  Shingrix  vaccination were discussed with the patient, Shingrix  vaccine is       up to date   the risks and benefits of tetanus vaccination were discussed with the patient, tetanus vaccine is      up to date   Cardiovascular screening and counseling the risk and benefits of screening were discussed counseling on maintaining a healthy diet and due for lipid panel  Breast cancer screening and counseling , the risks and benefits of screening were discussed with the patient, and screening is current       Cervical cancer screening and counseling , the risks and benefits of screening were discussed with the patient, and screening  order is in  Osteoporosis screening and counseling was given on obtaining adequate amounts of calcium and vitamin D on a daily basis and weight bearing exercise such as walking  , the risks and benefits of screening were discussed with the patient, and screening     order is in  Colorectal cancer screening and counseling; the risks and benefits of screening were discussed with the patient, colon cancer screening is     up to date , order is in   Abdominal aortic aneurysm screening and counseling,  the risks and benefits of screening were discussed with the patient, screening is not indicated   Visual acuity / Glaucoma screening and counseling , the risks and benefits of vision screening were discussed with the patient, screening is current   HIV screening and Counseling, the risks and  benefits of screening were discussed with the patient, screening is not indicated   Advance directive planning : needs to be updated information forms given to patient .  complete and up  to date   Other Preventive Counseling Provided :  fall risk reduction  seat belt use, sunscreen use , and increasing physical activity .  Patient Discussion:  plan discussed with the patient and follow-up visit needed                    Discussed with:   Return in :    Portions of this note were generated using digital voice recognition software, and may contain grammatical errors       Babar Luna MD  05/17/24  9:27 AM

## 2024-05-17 NOTE — PATIENT INSTRUCTIONS
RSV Vaccine before fall    Please call our  [phone] to assist with scheduling gynecology, bone density and cardiac CT

## 2024-05-20 ENCOUNTER — TELEPHONE (OUTPATIENT)
Dept: PRIMARY CARE | Facility: CLINIC | Age: 74
End: 2024-05-20
Payer: MEDICARE

## 2024-05-20 DIAGNOSIS — R82.90 ABNORMAL URINE FINDINGS: Primary | ICD-10-CM

## 2024-05-20 RX ORDER — CIPROFLOXACIN 500 MG/1
500 TABLET ORAL 2 TIMES DAILY
Qty: 14 TABLET | Refills: 0 | Status: SHIPPED | OUTPATIENT
Start: 2024-05-20 | End: 2024-05-27

## 2024-05-20 NOTE — TELEPHONE ENCOUNTER
Pt called 5/22/24.  Pt looking at her labs.  The lab did not do a lipid panel.  It was ordered back in November.  Do you want her to go back to lab?  Sample were taken on Monday 5/20; they may stil have sample.  LG to call pt.           Patient stated she had a urine test done on last Thursday. She thinks she has a UTI. She wants you to review the results andget back to her.

## 2024-05-22 DIAGNOSIS — E78.00 ELEVATED LDL CHOLESTEROL LEVEL: Primary | ICD-10-CM

## 2024-05-22 LAB
CHOLEST SERPL-MCNC: 231 MG/DL (ref 0–199)
CHOLESTEROL/HDL RATIO: 3.1
HDLC SERPL-MCNC: 74 MG/DL
LDLC SERPL CALC-MCNC: 144 MG/DL
NON HDL CHOLESTEROL: 157 MG/DL (ref 0–149)
TRIGL SERPL-MCNC: 67 MG/DL (ref 0–149)
VLDL: 13 MG/DL (ref 0–40)

## 2024-05-28 ENCOUNTER — TELEPHONE (OUTPATIENT)
Dept: PRIMARY CARE | Facility: CLINIC | Age: 74
End: 2024-05-28
Payer: MEDICARE

## 2024-05-28 DIAGNOSIS — R31.9 URINARY TRACT INFECTION WITH HEMATURIA, SITE UNSPECIFIED: Primary | ICD-10-CM

## 2024-05-28 DIAGNOSIS — N39.0 URINARY TRACT INFECTION WITH HEMATURIA, SITE UNSPECIFIED: Primary | ICD-10-CM

## 2024-05-28 NOTE — TELEPHONE ENCOUNTER
Pt called, finished Abx yesterday (Monday).  When do you want to repeat UA/UC?  She wants to do it Friday (Ca scoring test is on that day.)  LG to call back.

## 2024-05-31 ENCOUNTER — HOSPITAL ENCOUNTER (OUTPATIENT)
Dept: RADIOLOGY | Facility: HOSPITAL | Age: 74
Discharge: HOME | End: 2024-05-31
Payer: MEDICARE

## 2024-05-31 ENCOUNTER — LAB (OUTPATIENT)
Dept: LAB | Facility: LAB | Age: 74
End: 2024-05-31
Payer: MEDICARE

## 2024-05-31 DIAGNOSIS — R31.9 URINARY TRACT INFECTION WITH HEMATURIA, SITE UNSPECIFIED: ICD-10-CM

## 2024-05-31 DIAGNOSIS — N39.0 URINARY TRACT INFECTION WITH HEMATURIA, SITE UNSPECIFIED: ICD-10-CM

## 2024-05-31 DIAGNOSIS — E78.00 ELEVATED LDL CHOLESTEROL LEVEL: ICD-10-CM

## 2024-05-31 DIAGNOSIS — I10 HYPERTENSION, UNSPECIFIED TYPE: ICD-10-CM

## 2024-05-31 LAB
APPEARANCE UR: CLEAR
BILIRUB UR STRIP.AUTO-MCNC: NEGATIVE MG/DL
COLOR UR: NORMAL
GLUCOSE UR STRIP.AUTO-MCNC: NORMAL MG/DL
KETONES UR STRIP.AUTO-MCNC: NEGATIVE MG/DL
LEUKOCYTE ESTERASE UR QL STRIP.AUTO: NEGATIVE
NITRITE UR QL STRIP.AUTO: NEGATIVE
PH UR STRIP.AUTO: 6 [PH]
PROT UR STRIP.AUTO-MCNC: NEGATIVE MG/DL
RBC # UR STRIP.AUTO: NEGATIVE /UL
SP GR UR STRIP.AUTO: 1.01
UROBILINOGEN UR STRIP.AUTO-MCNC: NORMAL MG/DL

## 2024-05-31 PROCEDURE — 75571 CT HRT W/O DYE W/CA TEST: CPT

## 2024-05-31 PROCEDURE — 81003 URINALYSIS AUTO W/O SCOPE: CPT

## 2024-05-31 NOTE — TELEPHONE ENCOUNTER
Called Pt and relayed info.  Pt asked about Ca cardiac score results.  LG gave her basic info of the test and her results.  Told Pt that AQ would evaluate and contact her if further actions are needed.

## 2024-06-06 ENCOUNTER — TELEMEDICINE (OUTPATIENT)
Dept: PRIMARY CARE | Facility: CLINIC | Age: 74
End: 2024-06-06
Payer: MEDICARE

## 2024-06-06 ENCOUNTER — TELEPHONE (OUTPATIENT)
Dept: PRIMARY CARE | Facility: CLINIC | Age: 74
End: 2024-06-06

## 2024-06-06 DIAGNOSIS — R91.8 LUNG FIELD ABNORMAL FINDING ON EXAMINATION: ICD-10-CM

## 2024-06-06 DIAGNOSIS — R05.9 COUGH, UNSPECIFIED TYPE: Primary | ICD-10-CM

## 2024-06-06 PROCEDURE — 1123F ACP DISCUSS/DSCN MKR DOCD: CPT | Performed by: INTERNAL MEDICINE

## 2024-06-06 PROCEDURE — 99441 PR PHYS/QHP TELEPHONE EVALUATION 5-10 MIN: CPT | Performed by: INTERNAL MEDICINE

## 2024-06-06 RX ORDER — BENZONATATE 100 MG/1
100 CAPSULE ORAL 3 TIMES DAILY PRN
Qty: 30 CAPSULE | Refills: 0 | Status: SHIPPED | OUTPATIENT
Start: 2024-06-06 | End: 2024-06-10 | Stop reason: ALTCHOICE

## 2024-06-06 ASSESSMENT — ENCOUNTER SYMPTOMS
VOMITING: 0
HOARSE VOICE: 0
COUGH: 0
SHORTNESS OF BREATH: 0
STRIDOR: 0
SWOLLEN GLANDS: 0
SORE THROAT: 1
DIARRHEA: 0
HEADACHES: 0
NECK PAIN: 0
ABDOMINAL PAIN: 0
TROUBLE SWALLOWING: 0

## 2024-06-06 NOTE — PROGRESS NOTES
Subjective   Patient ID: Yumiko Camilo is a 74 y.o. female who presents for No chief complaint on file..    Sore Throat   This is a new problem. The current episode started yesterday. The problem has been rapidly improving. The pain is at a severity of 3/10. Pertinent negatives include no abdominal pain, congestion, coughing, diarrhea, drooling, ear discharge, ear pain, headaches, hoarse voice, plugged ear sensation, neck pain, shortness of breath, stridor, swollen glands, trouble swallowing or vomiting. She has had no exposure to strep or mono.       I performed this visit using real-time telehealth tools, including an audio/video connection between Yumiko Camilo and myself Dr Galaviz in office [facility], [place], [place]  Patient on with me on a virtual visit  Was supposed to be virtual but not working , tried to call her   Sick for the past two days  , no travel hx no ill contacts , live in a high rise condo   Does feel better took some chicken soup, orange juice ricola , water   Coughing not all the time throat hurt , chest tight no wheezing heard   Review of Systems   HENT:  Positive for sore throat. Negative for congestion, drooling, ear discharge, ear pain, hoarse voice and trouble swallowing.    Respiratory:  Negative for cough, shortness of breath and stridor.    Gastrointestinal:  Negative for abdominal pain, diarrhea and vomiting.   Musculoskeletal:  Negative for neck pain.   Neurological:  Negative for headaches.     General: see hpi  Ears, Nose, Throat : see hpi  Dermatologic: Negative for new skin conditions, rash  Respiratory: see hpi  Cardiovascular: Negative for chest pain, palpitations, or leg swelling  Gastrointestinal: Negative for nausea/vomiting, abdominal pain, changes in bowel habits  Neurological: Negative for dizziness see hpi headaches    Previous history  Past Medical History:   Diagnosis Date    Asymptomatic menopausal state     Asymptomatic menopausal state     Breast cancer (Multi)     Cellulitis, unspecified 02/09/2016    Cellulitis    Contact with and (suspected) exposure to other hazardous, chiefly nonmedicinal, chemicals     Exposure to chemical irritant    Disorder of external ear, unspecified, unspecified ear 03/07/2022    Disorder of ear lobe    Disorder of the skin and subcutaneous tissue, unspecified     Foot lesion    Encounter for general adult medical examination without abnormal findings 03/03/2022    Encounter for Medicare annual wellness exam    Encounter for gynecological examination (general) (routine) without abnormal findings 04/25/2016    Well woman exam    Encounter for immunization 09/16/2020    Needs flu shot    Encounter for screening for malignant neoplasm of cervix 04/25/2016    Screening for cervical cancer    Fall (on)(from) sidewalk curb, initial encounter     Fall on or from sidewalk curb    Fracture of unspecified part of left clavicle, initial encounter for closed fracture     Closed left clavicular fracture    Mammographic calcification found on diagnostic imaging of breast 12/01/2015    Breast calcification, right    Mixed hyperlipidemia 11/22/2022    Combined hyperlipidemia    Obesity, unspecified 11/04/2021    Obesity    Other abnormal and inconclusive findings on diagnostic imaging of breast 11/30/2015    Mammogram abnormal    Other conditions influencing health status 02/04/2016    Postoperative seroma    Other injury of unspecified body region, initial encounter 03/03/2022    Wound of skin    Other specified disorders of bone density and structure, unspecified site 02/09/2021    Osteopenia    Other specified personal risk factors, not elsewhere classified 01/04/2017    History of exposure    Pain in left shoulder 08/20/2021    Left shoulder pain    Personal history of (healed) traumatic fracture     History of fracture of toe    Personal history of irradiation     Personal history of malignant neoplasm of breast 06/06/2016    History  of malignant neoplasm of breast    Personal history of other specified conditions 2017    History of fatigue    Personal history of traumatic brain injury     History of concussion    Post-traumatic stress disorder, unspecified     PTSD (post-traumatic stress disorder)    Unspecified contact dermatitis due to other chemical products 2018    Latex allergy, contact dermatitis    Unspecified contact dermatitis, unspecified cause 2022    Contact dermatitis     Past Surgical History:   Procedure Laterality Date    BREAST LUMPECTOMY Right     CATARACT EXTRACTION  2018    Cataract Surgery    MOUTH SURGERY  2018    Oral Surgery Tooth Extraction    OTHER SURGICAL HISTORY  2018    Surgically Induced  - By Dilation And Evacuation    SENTINEL LYMPH NODE BIOPSY  2018    [place] Lymph Node Biopsy     Social History     Tobacco Use    Smoking status: Former     Types: Cigarettes    Smokeless tobacco: Former   Vaping Use    Vaping status: Never Used   Substance Use Topics    Alcohol use: Yes     Alcohol/week: 1.0 standard drink of alcohol     Types: 1 Glasses of wine per week    Drug use: Never     Family History   Problem Relation Name Age of Onset    No Known Problems Sister      No Known Problems Brother      Breast cancer Maternal Grandmother  42     Allergies   Allergen Reactions    Bacitracin Zinc-Polymyxin B Hives    Penicillin Other    Adhesive Itching and Rash    Benzalkonium Chloride Rash    Elastin Rash    Latex Rash    Nickel Rash     Current Outpatient Medications   Medication Instructions    CALCIUM CITRATE-VITAMIN D3 ORAL oral    cholecalciferol (Vitamin D-3) 50 mcg (2,000 unit) capsule oral    lisinopril 10 mg, oral, Daily    multivitamin tablet oral    niacin 500 mg tablet oral       Objective       Physical Exam  TELEMEDICINE EXAM:  General:  alert , voice sounds clear   no cough    no hoarseness , speaks in full sentences strong clear voice  Mood level voice ,  Neuro oriented to time , place, and person      Assessment/Plan   Yumiko Camilo is a 74 y.o. female who presents for the concerns below:    Problem List Items Addressed This Visit    None  COUGH PLAN Rest, sleep is important.   Hydration important at least 6-8 glasses of water  Take analgesics Tylenol   Frequent hand washing  Sanitize surroundings  Guaifenesin if tolerated  Change toothbrush once recovered.   Antimicrobial therapy as appropriate - Amoxicillin if not allergic, Azithromycin or Doxycycline  if with allergies Live culture yogurt or probiotics to help regrow good bacteria  Will also add benzonatate perles and have patient over the counter guaifenesin DM . Call if shortness of breath, blood and sputum, change in character of cough, development of fever or chills.   If with inability to maintain nutrition and hydration seek emergent care.  Avoid exposure to tobacco smoke and fumes. cautioned that  antibiotic may cause c diff colitis  ,  Time Spent  with patient:  7  minutes of which greater than 50 percent was spent counselling and coordinating care.           Discussed with:   Return in :    Portions of this note were generated using digital voice recognition software, and may contain grammatical errors       Babar Luna MD  06/06/24  1:39 PM

## 2024-06-06 NOTE — TELEPHONE ENCOUNTER
Patient called she started yesterday with a sore throat, chest tightness, congestion, sore throat  Body aches no fever she wanted to know if she could see you today and be prescribed something?    [phone]

## 2024-06-10 ENCOUNTER — HOSPITAL ENCOUNTER (OUTPATIENT)
Dept: RADIOLOGY | Facility: CLINIC | Age: 74
Discharge: HOME | End: 2024-06-10
Payer: MEDICARE

## 2024-06-10 ENCOUNTER — OFFICE VISIT (OUTPATIENT)
Dept: PRIMARY CARE | Facility: CLINIC | Age: 74
End: 2024-06-10
Payer: MEDICARE

## 2024-06-10 DIAGNOSIS — Z17.0 MALIGNANT NEOPLASM OF RIGHT BREAST IN FEMALE, ESTROGEN RECEPTOR POSITIVE, UNSPECIFIED SITE OF BREAST (MULTI): ICD-10-CM

## 2024-06-10 DIAGNOSIS — M54.9 BACK PAIN, UNSPECIFIED BACK LOCATION, UNSPECIFIED BACK PAIN LATERALITY, UNSPECIFIED CHRONICITY: ICD-10-CM

## 2024-06-10 DIAGNOSIS — M25.551 RIGHT HIP PAIN: ICD-10-CM

## 2024-06-10 DIAGNOSIS — M25.551 RIGHT HIP PAIN: Primary | ICD-10-CM

## 2024-06-10 DIAGNOSIS — C50.911 MALIGNANT NEOPLASM OF RIGHT BREAST IN FEMALE, ESTROGEN RECEPTOR POSITIVE, UNSPECIFIED SITE OF BREAST (MULTI): ICD-10-CM

## 2024-06-10 LAB — NONINV COLON CA DNA+OCC BLD SCRN STL QL: NEGATIVE

## 2024-06-10 PROCEDURE — 1036F TOBACCO NON-USER: CPT | Performed by: INTERNAL MEDICINE

## 2024-06-10 PROCEDURE — 72100 X-RAY EXAM L-S SPINE 2/3 VWS: CPT

## 2024-06-10 PROCEDURE — 73502 X-RAY EXAM HIP UNI 2-3 VIEWS: CPT | Mod: RIGHT SIDE | Performed by: RADIOLOGY

## 2024-06-10 PROCEDURE — 99213 OFFICE O/P EST LOW 20 MIN: CPT | Performed by: INTERNAL MEDICINE

## 2024-06-10 PROCEDURE — 72100 X-RAY EXAM L-S SPINE 2/3 VWS: CPT | Performed by: RADIOLOGY

## 2024-06-10 PROCEDURE — 1159F MED LIST DOCD IN RCRD: CPT | Performed by: INTERNAL MEDICINE

## 2024-06-10 PROCEDURE — 73502 X-RAY EXAM HIP UNI 2-3 VIEWS: CPT | Mod: RT

## 2024-06-10 PROCEDURE — 1124F ACP DISCUSS-NO DSCNMKR DOCD: CPT | Performed by: INTERNAL MEDICINE

## 2024-06-10 RX ORDER — LIDOCAINE 50 MG/G
1 PATCH TOPICAL DAILY
Qty: 30 PATCH | Refills: 1 | Status: SHIPPED | OUTPATIENT
Start: 2024-06-10 | End: 2024-08-09

## 2024-06-10 RX ORDER — ETODOLAC 500 MG/1
500 TABLET, FILM COATED ORAL 2 TIMES DAILY
Qty: 40 TABLET | Refills: 0 | Status: SHIPPED | OUTPATIENT
Start: 2024-06-10 | End: 2024-06-30

## 2024-06-10 RX ORDER — CYCLOBENZAPRINE HCL 10 MG
10 TABLET ORAL NIGHTLY PRN
Qty: 30 TABLET | Refills: 0 | Status: SHIPPED | OUTPATIENT
Start: 2024-06-10 | End: 2024-08-09

## 2024-06-10 ASSESSMENT — ENCOUNTER SYMPTOMS
DEPRESSION: 0
LOSS OF SENSATION IN FEET: 0
OCCASIONAL FEELINGS OF UNSTEADINESS: 0

## 2024-06-10 NOTE — PROGRESS NOTES
Subjective   Patient ID: Yumiko Camilo is a 74 y.o. female who presents for Hip Pain (Right/) and Leg Pain (right).    HPI  Patient in for a visit  Woke up Sunday well until she coughed   And the pain came on , constant pain , tylenol did not work   Could not sleep   Review of Systems  General: Denies fever, chills, night sweats,  Dermatologic: Negative for new skin conditions, rash  Respiratory: Negative for wheezing, shortness of breath, cough  Cardiovascular: Negative for chest pain, palpitations, or leg swelling  Gastrointestinal: Negative for nausea/vomiting, abdominal pain, changes in bowel habits  Genitourinary Negative for Urinary Incontinence  urgency , frequency, discomfort   Musculoskeletal: see hpi  Neurological: Negative for headaches, dizziness'  Previous history  Past Medical History:   Diagnosis Date    Asymptomatic menopausal state     Asymptomatic menopausal state    Breast cancer (Multi)     Cellulitis, unspecified 02/09/2016    Cellulitis    Contact with and (suspected) exposure to other hazardous, chiefly nonmedicinal, chemicals     Exposure to chemical irritant    Disorder of external ear, unspecified, unspecified ear 03/07/2022    Disorder of ear lobe    Disorder of the skin and subcutaneous tissue, unspecified     Foot lesion    Encounter for general adult medical examination without abnormal findings 03/03/2022    Encounter for Medicare annual wellness exam    Encounter for gynecological examination (general) (routine) without abnormal findings 04/25/2016    Well woman exam    Encounter for immunization 09/16/2020    Needs flu shot    Encounter for screening for malignant neoplasm of cervix 04/25/2016    Screening for cervical cancer    Fall (on)(from) sidewalk curb, initial encounter     Fall on or from sidewalk curb    Fracture of unspecified part of left clavicle, initial encounter for closed fracture     Closed left clavicular fracture    Mammographic calcification found on diagnostic  imaging of breast 2015    Breast calcification, right    Mixed hyperlipidemia 2022    Combined hyperlipidemia    Obesity, unspecified 2021    Obesity    Other abnormal and inconclusive findings on diagnostic imaging of breast 2015    Mammogram abnormal    Other conditions influencing health status 2016    Postoperative seroma    Other injury of unspecified body region, initial encounter 2022    Wound of skin    Other specified disorders of bone density and structure, unspecified site 2021    Osteopenia    Other specified personal risk factors, not elsewhere classified 2017    History of exposure    Pain in left shoulder 2021    Left shoulder pain    Personal history of (healed) traumatic fracture     History of fracture of toe    Personal history of irradiation     Personal history of malignant neoplasm of breast 2016    History of malignant neoplasm of breast    Personal history of other specified conditions 2017    History of fatigue    Personal history of traumatic brain injury     History of concussion    Post-traumatic stress disorder, unspecified     PTSD (post-traumatic stress disorder)    Unspecified contact dermatitis due to other chemical products 2018    Latex allergy, contact dermatitis    Unspecified contact dermatitis, unspecified cause 2022    Contact dermatitis     Past Surgical History:   Procedure Laterality Date    BREAST LUMPECTOMY Right     CATARACT EXTRACTION  2018    Cataract Surgery    MOUTH SURGERY  2018    Oral Surgery Tooth Extraction    OTHER SURGICAL HISTORY  2018    Surgically Induced  - By Dilation And Evacuation    SENTINEL LYMPH NODE BIOPSY  2018    [place] Lymph Node Biopsy     Social History     Tobacco Use    Smoking status: Former     Current packs/day: 0.00     Types: Cigarettes    Smokeless tobacco: Former   Vaping Use    Vaping status: Never Used   Substance Use  Topics    Alcohol use: Yes     Alcohol/week: 1.0 standard drink of alcohol     Types: 1 Glasses of wine per week    Drug use: Never     Family History   Problem Relation Name Age of Onset    No Known Problems Sister      No Known Problems Brother      Breast cancer Maternal Grandmother Ivone 42     Allergies   Allergen Reactions    Bacitracin Zinc-Polymyxin B Hives    Penicillin Other    Adhesive Itching and Rash    Benzalkonium Chloride Rash    Elastin Rash    Latex Rash    Nickel Rash     Current Outpatient Medications   Medication Instructions    CALCIUM CITRATE-VITAMIN D3 ORAL oral    cholecalciferol (Vitamin D-3) 50 mcg (2,000 unit) capsule oral    lisinopril 10 mg, oral, Daily    multivitamin tablet oral    niacin 500 mg tablet oral       Objective       Physical Exam  Vital Signs: as recorded above  General: Well groomed, well nourished tearful when reporting on back hip pain and when she moves   Orientation:  Alert , oriented to time, place , and person   Mood and Affect:  Cooperative , no apparent distress normal affect  Eyes: Extra ocular muscle movements intact, anicteric sclerae  Neck: Supple, full range of movement  Chest: Normal breath sounds, normal chest wall exam, symmetric, good air entry, clear to auscultation  Heart: Regular rate and rhythm, without murmur, gallop, or rubs  BACK:  no CTLS spine tenderness, no flank tenderness  Extremities: full range of movement  bilateral UE and bilateral LE,  no lower extremity edema mod tenderness right hip and lateral knee  Neurological: Alert, oriented, cranial nerves II-XII grossly intact except for visual acuity  Motor 5/5 bilateral LE hip   Sensation:  Intact   Gait: normal steady      Assessment/Plan   Yumiko Camilo is a 74 y.o. female who presents for the concerns below:    Problem List Items Addressed This Visit    None  RIGHT HIP AND KNEE WITH BACK Pain AND SCIATICA SXS Plan : Rest no strenuous activity , heavy lifting    Stretching exercises  heat  compress use the heated seat    Etodolac - NSAIDs with meals  lidocaine patch   Muscle relaxant, cautioned on sedation if with sedation just take one tablet at bedtime   If not improved will add steroid taper to replace nsaids order physical therapy and will order x-ray of the spine/back.    If persistent will consult with ortho   If with any bowel or bladder dysfunction or falling  due to weakness advised emergent evaluation.    Follow-up as planned to make sure problem has resolved.       Discussed with:   Return in :    Portions of this note were generated using digital voice recognition software, and may contain grammatical errors       Babar Luna MD  06/10/24  1:48 PMPatient was identified as a fall risk. Risk prevention instructions provided.

## 2024-06-11 ENCOUNTER — TELEPHONE (OUTPATIENT)
Dept: PRIMARY CARE | Facility: CLINIC | Age: 74
End: 2024-06-11
Payer: MEDICARE

## 2024-06-11 DIAGNOSIS — M25.551 RIGHT HIP PAIN: Primary | ICD-10-CM

## 2024-06-11 DIAGNOSIS — M54.9 BACK PAIN, UNSPECIFIED BACK LOCATION, UNSPECIFIED BACK PAIN LATERALITY, UNSPECIFIED CHRONICITY: ICD-10-CM

## 2024-06-11 NOTE — TELEPHONE ENCOUNTER
Patient stated she had an x-ray done of her hip, pelvis and spine on yesterday.  She is still in a lot of pain. Patient requesting a call back with the results.

## 2024-06-18 ENCOUNTER — TELEPHONE (OUTPATIENT)
Dept: RADIATION ONCOLOGY | Facility: HOSPITAL | Age: 74
End: 2024-06-18
Payer: MEDICARE

## 2024-06-18 NOTE — TELEPHONE ENCOUNTER
Called pt to remind of appointment on 6/19/24 at 3:00 Pt confirmed appointment.     Izzy Pinto MA   no

## 2024-06-19 ENCOUNTER — HOSPITAL ENCOUNTER (OUTPATIENT)
Dept: RADIATION ONCOLOGY | Facility: HOSPITAL | Age: 74
Setting detail: RADIATION/ONCOLOGY SERIES
Discharge: HOME | End: 2024-06-19
Payer: MEDICARE

## 2024-06-19 VITALS
SYSTOLIC BLOOD PRESSURE: 151 MMHG | HEART RATE: 90 BPM | TEMPERATURE: 97.3 F | DIASTOLIC BLOOD PRESSURE: 90 MMHG | BODY MASS INDEX: 32.27 KG/M2 | WEIGHT: 182.1 LBS | RESPIRATION RATE: 18 BRPM | OXYGEN SATURATION: 95 % | HEIGHT: 63 IN

## 2024-06-19 DIAGNOSIS — C50.911 MALIGNANT NEOPLASM OF RIGHT BREAST IN FEMALE, ESTROGEN RECEPTOR POSITIVE, UNSPECIFIED SITE OF BREAST (MULTI): ICD-10-CM

## 2024-06-19 DIAGNOSIS — Z17.0 MALIGNANT NEOPLASM OF RIGHT BREAST IN FEMALE, ESTROGEN RECEPTOR POSITIVE, UNSPECIFIED SITE OF BREAST (MULTI): ICD-10-CM

## 2024-06-19 DIAGNOSIS — C50.411 MALIGNANT NEOPLASM OF UPPER-OUTER QUADRANT OF RIGHT FEMALE BREAST (MULTI): ICD-10-CM

## 2024-06-19 PROCEDURE — 99213 OFFICE O/P EST LOW 20 MIN: CPT | Performed by: NURSE PRACTITIONER

## 2024-06-19 ASSESSMENT — PATIENT HEALTH QUESTIONNAIRE - PHQ9
2. FEELING DOWN, DEPRESSED OR HOPELESS: NOT AT ALL
SUM OF ALL RESPONSES TO PHQ9 QUESTIONS 1 AND 2: 0
1. LITTLE INTEREST OR PLEASURE IN DOING THINGS: NOT AT ALL

## 2024-06-19 ASSESSMENT — ENCOUNTER SYMPTOMS
DEPRESSION: 0
LOSS OF SENSATION IN FEET: 0
OCCASIONAL FEELINGS OF UNSTEADINESS: 0

## 2024-06-19 ASSESSMENT — COLUMBIA-SUICIDE SEVERITY RATING SCALE - C-SSRS
2. HAVE YOU ACTUALLY HAD ANY THOUGHTS OF KILLING YOURSELF?: NO
1. IN THE PAST MONTH, HAVE YOU WISHED YOU WERE DEAD OR WISHED YOU COULD GO TO SLEEP AND NOT WAKE UP?: NO
6. HAVE YOU EVER DONE ANYTHING, STARTED TO DO ANYTHING, OR PREPARED TO DO ANYTHING TO END YOUR LIFE?: NO

## 2024-06-19 ASSESSMENT — PAIN SCALES - GENERAL: PAINLEVEL: 0-NO PAIN

## 2024-06-19 NOTE — PROGRESS NOTES
Radiation Oncology Follow-Up    Patient Name:  Yumiko Camilo  MRN:  [id number]  :  1950    Referring Provider: Mali Sprague, APR*  Primary Care Provider: Babar Luna MD  Care Team: Patient Care Team:  Babar Luna MD as PCP - General  Babar Luna MD as PCP - [facility] Attributed Provider    Date of Service: 2024     Cancer Staging:   Treatment Synopsis:    74-year-old female with a history of T1b N0 M0 invasive lobular carcinoma of the right breast, estrogen receptor positive, progesterone receptor positive,  and HER-2 negative. She is status post partial mastectomy with clear margins and sentinel lymph node biopsy. Following surgery, she received radiation therapy to the whole breast consisting of a dose of 42.56 Gy given in 16 fractions, completed on 2016. She was initiated on anastrozole hormonal therapy post treatment; unable to tolerate anastrozole due to cognitive dysfunction and started on exemestane .    SUBJECTIVE  History of Present Illness:   Yumiko Camilo is here today for routine radiation follow up/surveillance visit.  She is doing very well. She had an URI a few weeks ago and while coughing pulled a muscle and currently getting PT.  She denies any palpable findings on breast self exam.  No swelling of right arm or difficulty with ROM.  No headaches, fever, chills, cough, SOB, chest pain or bony pain.  Energy level and appetite are good.  Mammogram is due in March.     Review of Systems:    Review of Systems   All other systems reviewed and are negative.    Performance Status:   The Karnofsky performance scale today is 100, Fully active, able to carry on all pre-disease performed without restriction (ECOG equivalent 0).      OBJECTIVE    Current Outpatient Medications:     CALCIUM CITRATE-VITAMIN D3 ORAL, Take by mouth., Disp: , Rfl:     cholecalciferol (Vitamin D-3) 50 mcg (2,000 unit) capsule, Take by  mouth., Disp: , Rfl:     cyclobenzaprine (Flexeril) 10 mg tablet, Take 1 tablet (10 mg) by mouth as needed at bedtime for muscle spasms., Disp: 30 tablet, Rfl: 0    etodolac (Lodine) 500 mg tablet, Take 1 tablet (500 mg) by mouth 2 times a day for 20 days. With full meals, Disp: 40 tablet, Rfl: 0    lidocaine (Lidoderm) 5 % patch, Place 1 patch over 12 hours on the skin once daily. Apply to painful area 12 hours per day, remove for 12 hours., Disp: 30 patch, Rfl: 1    lisinopril 10 mg tablet, TAKE 1 TABLET BY MOUTH EVERY DAY, Disp: 90 tablet, Rfl: 1    multivitamin tablet, Take by mouth., Disp: , Rfl:     niacin 500 mg tablet, Take by mouth., Disp: , Rfl:      Physical Exam  Constitutional:       Appearance: Normal appearance.   HENT:      Head: Normocephalic and atraumatic.      Nose: Nose normal.      Mouth/Throat:      Mouth: Mucous membranes are moist.      Pharynx: Oropharynx is clear.   Eyes:      Conjunctiva/sclera: Conjunctivae normal.      Pupils: Pupils are equal, round, and reactive to light.   Cardiovascular:      Rate and Rhythm: Normal rate.      Heart sounds: Normal heart sounds.   Pulmonary:      Effort: Pulmonary effort is normal.      Breath sounds: Normal breath sounds.   Chest:   Breasts:     Right: Normal. No swelling, inverted nipple, mass, nipple discharge or skin change.      Left: Normal. No swelling, inverted nipple, mass, nipple discharge or skin change.   Abdominal:      Palpations: Abdomen is soft.   Musculoskeletal:         General: No swelling. Normal range of motion.      Cervical back: Normal range of motion.   Lymphadenopathy:      Cervical: No cervical adenopathy.      Upper Body:      Right upper body: No supraclavicular or axillary adenopathy.      Left upper body: No supraclavicular or axillary adenopathy.   Skin:     General: Skin is warm and dry.   Neurological:      General: No focal deficit present.      Mental Status: She is alert and oriented to person, place, and time.    Psychiatric:         Mood and Affect: Mood normal.         Behavior: Behavior normal.      Narrative & Impression   Interpreted By:  Mikayla Grimm,   STUDY:  BI MAMMO BILATERAL DIAGNOSTIC TOMOSYNTHESIS;  3/25/2024 8:59 am      ACCESSION NUMBER(S):  [id number]      ORDERING CLINICIAN:  BARRETT JEAN      INDICATION:  Right lumpectomy with radiation. Annual mammogram.      COMPARISON:  03/07/2023, 03/01/2022, 02/25/2021.      FINDINGS:  2D and tomosynthesis images were reviewed at 1 mm slice thickness.      Density:  There are areas of scattered fibroglandular tissue.      Postoperative scarring is seen in the superolateral right breast. The  parenchymal pattern is stable. There is mild right breast skin  thickening, consistent with post radiation changes. No suspicious  masses or calcifications are identified.      IMPRESSION:  No mammographic evidence of malignancy.      BI-RADS CATEGORY:      BI-RADS Category:  2 Benign.  Recommendation:  Annual Screening.  Recommended Date:  1 Year.  Laterality:  Bilateral.      ASSESSMENT/PLAN:  74 y.o. female with early stage right breast cancer s/p breast conserving surgery followed by radiation.  She is clinically and radiographically without evidence of  recurrence. Cosmetic outcome is excellent.  Mammogram in March.  She will call me with any concerns.       Mayra Jean CNP  [phone]

## 2024-06-24 ENCOUNTER — TELEPHONE (OUTPATIENT)
Dept: PRIMARY CARE | Facility: CLINIC | Age: 74
End: 2024-06-24
Payer: MEDICARE

## 2024-06-24 DIAGNOSIS — M25.551 RIGHT HIP PAIN: Primary | ICD-10-CM

## 2024-06-24 NOTE — TELEPHONE ENCOUNTER
Patient stated she needs a prescription for a handicap placard sticker. She wants it mailed to her home address.

## 2024-06-29 DIAGNOSIS — M54.9 BACK PAIN, UNSPECIFIED BACK LOCATION, UNSPECIFIED BACK PAIN LATERALITY, UNSPECIFIED CHRONICITY: Primary | ICD-10-CM

## 2024-07-02 ENCOUNTER — APPOINTMENT (OUTPATIENT)
Dept: ENDOCRINOLOGY | Facility: CLINIC | Age: 74
End: 2024-07-02
Payer: MEDICARE

## 2024-07-02 DIAGNOSIS — M25.551 RIGHT HIP PAIN: ICD-10-CM

## 2024-07-02 DIAGNOSIS — M54.9 BACK PAIN, UNSPECIFIED BACK LOCATION, UNSPECIFIED BACK PAIN LATERALITY, UNSPECIFIED CHRONICITY: ICD-10-CM

## 2024-07-02 RX ORDER — ETODOLAC 500 MG/1
500 TABLET, FILM COATED ORAL 2 TIMES DAILY
Qty: 40 TABLET | Refills: 0 | Status: SHIPPED | OUTPATIENT
Start: 2024-07-02 | End: 2024-07-22

## 2024-07-02 NOTE — TELEPHONE ENCOUNTER
Pt called in regards for refill on   etodolac (Lodine) 500 mg tablet     [facility] [place]    Next appt 11/18/24

## 2024-07-16 ENCOUNTER — TELEPHONE (OUTPATIENT)
Dept: PRIMARY CARE | Facility: CLINIC | Age: 74
End: 2024-07-16
Payer: MEDICARE

## 2024-07-16 NOTE — TELEPHONE ENCOUNTER
Patient called and wanted to discuss multiple issues with you    She has had to falls - she was seen at [facility] and scheduled for an MRI  She wanted you to review those results    She wanted to follow up with you on her PT progress'    She wanted to know the results of her cologuard and lab work she stated she has not received any results.    She requested to speak directly to you after you review everything    [phone]

## 2024-07-19 NOTE — TELEPHONE ENCOUNTER
7/19 Patient called today, she wanted to let you know that she had the MRI done at [facility].

## 2024-07-22 NOTE — TELEPHONE ENCOUNTER
Patient called she has left 2 messages with Jorge Garland to get her MRI results and has not gotten a call back.  She said it has been 2-3 days.  She wanted to know how to get the results?  She is very frustrated and wanted you  opinion on what to do?    [phone]

## 2024-07-23 ENCOUNTER — APPOINTMENT (OUTPATIENT)
Dept: PHYSICAL THERAPY | Facility: CLINIC | Age: 74
End: 2024-07-23
Payer: MEDICARE

## 2024-08-02 ENCOUNTER — TELEPHONE (OUTPATIENT)
Dept: PRIMARY CARE | Facility: CLINIC | Age: 74
End: 2024-08-02
Payer: MEDICARE

## 2024-08-02 DIAGNOSIS — I10 HYPERTENSION, UNSPECIFIED TYPE: ICD-10-CM

## 2024-08-02 RX ORDER — LISINOPRIL 10 MG/1
10 TABLET ORAL DAILY
Qty: 90 TABLET | Refills: 1 | Status: SHIPPED | OUTPATIENT
Start: 2024-08-02

## 2024-08-02 RX ORDER — LISINOPRIL 10 MG/1
10 TABLET ORAL DAILY
Qty: 90 TABLET | Refills: 1 | Status: SHIPPED | OUTPATIENT
Start: 2024-08-02 | End: 2024-08-02 | Stop reason: SDUPTHER

## 2024-08-02 NOTE — TELEPHONE ENCOUNTER
Patient requested a refill on    Lisinopril 10 mg tablet #90 take 1 tablet by mouth every day    [facility] [place] [phone]

## 2024-08-02 NOTE — TELEPHONE ENCOUNTER
Patient requested a refill on     Lisinopril 10 mg tablet #90 take 1 tablet by mouth every day     [facility] [place] [phone]

## 2024-08-20 ENCOUNTER — APPOINTMENT (OUTPATIENT)
Dept: OBSTETRICS AND GYNECOLOGY | Facility: HOSPITAL | Age: 74
End: 2024-08-20
Payer: MEDICARE

## 2024-09-06 ENCOUNTER — APPOINTMENT (OUTPATIENT)
Dept: RADIOLOGY | Facility: HOSPITAL | Age: 74
End: 2024-09-06
Payer: MEDICARE

## 2024-09-26 ENCOUNTER — APPOINTMENT (OUTPATIENT)
Dept: ENDOCRINOLOGY | Facility: CLINIC | Age: 74
End: 2024-09-26
Payer: MEDICARE

## 2024-10-07 ENCOUNTER — TELEPHONE (OUTPATIENT)
Dept: PRIMARY CARE | Facility: CLINIC | Age: 74
End: 2024-10-07
Payer: MEDICARE

## 2024-10-07 DIAGNOSIS — R21 RASH: Primary | ICD-10-CM

## 2024-10-07 RX ORDER — CLOBETASOL PROPIONATE 0.5 MG/G
CREAM TOPICAL
Qty: 30 G | Refills: 0 | Status: SHIPPED | OUTPATIENT
Start: 2024-10-07 | End: 2024-10-08 | Stop reason: SDUPTHER

## 2024-10-07 NOTE — TELEPHONE ENCOUNTER
Pt called stating you had prescribed an anti-itch cream before and was wondering if she could get that sent in again. She said I was either Betamethasone or Clobetasol. She has a small itchy rash on her arm. Pharmacy is [facility] in [place].

## 2024-10-08 ENCOUNTER — APPOINTMENT (OUTPATIENT)
Dept: ENDOCRINOLOGY | Facility: HOSPITAL | Age: 74
End: 2024-10-08
Payer: MEDICARE

## 2024-10-08 DIAGNOSIS — R21 RASH: ICD-10-CM

## 2024-10-08 RX ORDER — CLOBETASOL PROPIONATE 0.5 MG/G
CREAM TOPICAL
Qty: 30 G | Refills: 0 | Status: SHIPPED | OUTPATIENT
Start: 2024-10-08

## 2024-10-08 NOTE — TELEPHONE ENCOUNTER
Pt called again asking for cream to be sent to [facility] in [place] on [address]. Please advise.

## 2024-10-08 NOTE — TELEPHONE ENCOUNTER
I know. She wanted it to be sent to [address] [place] [address], [place] [address]. She is unable to go to the [place] one to pickup. Please advise.

## 2024-10-25 ENCOUNTER — APPOINTMENT (OUTPATIENT)
Dept: RADIOLOGY | Facility: HOSPITAL | Age: 74
End: 2024-10-25
Payer: MEDICARE

## 2024-11-18 ENCOUNTER — APPOINTMENT (OUTPATIENT)
Dept: PRIMARY CARE | Facility: CLINIC | Age: 74
End: 2024-11-18
Payer: MEDICARE

## 2024-11-18 DIAGNOSIS — I10 HYPERTENSION, UNSPECIFIED TYPE: Primary | ICD-10-CM

## 2024-11-18 DIAGNOSIS — M21.371 RIGHT FOOT DROP: ICD-10-CM

## 2024-11-18 PROCEDURE — 1036F TOBACCO NON-USER: CPT | Performed by: INTERNAL MEDICINE

## 2024-11-18 PROCEDURE — 1123F ACP DISCUSS/DSCN MKR DOCD: CPT | Performed by: INTERNAL MEDICINE

## 2024-11-18 PROCEDURE — 99213 OFFICE O/P EST LOW 20 MIN: CPT | Performed by: INTERNAL MEDICINE

## 2024-11-18 NOTE — PROGRESS NOTES
Subjective   Patient ID: Yumiko Camilo is a 74 y.o. female who presents for No chief complaint on file..    HPI    I performed this visit using real-time telehealth tools, including an audio/video connection between Yumiko Camilo and myself Dr Galaviz in office [facility], [place], [place]  Patient on with me on a virtual visit  Going to [facility] for PT   Review of Systems  General: Denies fever, chills, night sweats,  Eyes: Negative for recent visual changes  Ears, Nose, Throat :  Negative for hearing changes, sinus discomfort  Dermatologic: Negative for new skin conditions, rash  Respiratory: Negative for wheezing, shortness of breath, cough  Cardiovascular: Negative for chest pain, palpitations, or leg swelling  Gastrointestinal: Negative for nausea/vomiting, abdominal pain, changes in bowel habits  Genitourinary Negative for Urinary Incontinence  urgency , frequency, discomfort   Musculoskeletal: see hpi  Neurological: Negative for headaches, dizziness    Previous history  Past Medical History:   Diagnosis Date    Asymptomatic menopausal state     Asymptomatic menopausal state    Breast cancer (Multi)     Cellulitis, unspecified 02/09/2016    Cellulitis    Contact with and (suspected) exposure to other hazardous, chiefly nonmedicinal, chemicals     Exposure to chemical irritant    Disorder of external ear, unspecified, unspecified ear 03/07/2022    Disorder of ear lobe    Disorder of the skin and subcutaneous tissue, unspecified     Foot lesion    Encounter for general adult medical examination without abnormal findings 03/03/2022    Encounter for Medicare annual wellness exam    Encounter for gynecological examination (general) (routine) without abnormal findings 04/25/2016    Well woman exam    Encounter for immunization 09/16/2020    Needs flu shot    Encounter for screening for malignant neoplasm of cervix 04/25/2016    Screening for cervical cancer    Fall (on)(from) [facility]  curb, initial encounter     Fall on or from sidewalk curb    Fracture of unspecified part of left clavicle, initial encounter for closed fracture     Closed left clavicular fracture    Mammographic calcification found on diagnostic imaging of breast 2015    Breast calcification, right    Mixed hyperlipidemia 2022    Combined hyperlipidemia    Obesity, unspecified 2021    Obesity    Other abnormal and inconclusive findings on diagnostic imaging of breast 2015    Mammogram abnormal    Other conditions influencing health status 2016    Postoperative seroma    Other injury of unspecified body region, initial encounter 2022    Wound of skin    Other specified disorders of bone density and structure, unspecified site 2021    Osteopenia    Other specified personal risk factors, not elsewhere classified 2017    History of exposure    Pain in left shoulder 2021    Left shoulder pain    Personal history of (healed) traumatic fracture     History of fracture of toe    Personal history of irradiation     Personal history of malignant neoplasm of breast 2016    History of malignant neoplasm of breast    Personal history of other specified conditions 2017    History of fatigue    Personal history of traumatic brain injury     History of concussion    Post-traumatic stress disorder, unspecified     PTSD (post-traumatic stress disorder)    Unspecified contact dermatitis due to other chemical products 2018    Latex allergy, contact dermatitis    Unspecified contact dermatitis, unspecified cause 2022    Contact dermatitis     Past Surgical History:   Procedure Laterality Date    BREAST LUMPECTOMY Right     CATARACT EXTRACTION  2018    Cataract Surgery    MOUTH SURGERY  2018    Oral Surgery Tooth Extraction    OTHER SURGICAL HISTORY  2018    Surgically Induced  - By Dilation And Evacuation    SENTINEL LYMPH NODE BIOPSY  2018     [place] Lymph Node Biopsy     Social History     Tobacco Use    Smoking status: Former     Types: Cigarettes    Smokeless tobacco: Former   Vaping Use    Vaping status: Never Used   Substance Use Topics    Alcohol use: Yes     Alcohol/week: 1.0 standard drink of alcohol     Types: 1 Glasses of wine per week    Drug use: Never     Family History   Problem Relation Name Age of Onset    No Known Problems Sister      No Known Problems Brother      Breast cancer Maternal Grandmother Ivone 42     Allergies   Allergen Reactions    Bacitracin Zinc-Polymyxin B Hives    Penicillin Other    Adhesive Itching and Rash    Benzalkonium Chloride Rash    Elastin Rash    Latex Rash    Nickel Rash     Current Outpatient Medications   Medication Instructions    CALCIUM CITRATE-VITAMIN D3 ORAL oral    cholecalciferol (Vitamin D-3) 50 mcg (2,000 unit) capsule oral    clobetasol (Temovate) 0.05 % cream Sparingly to affected intact skin only twice a day    cyclobenzaprine (FLEXERIL) 10 mg, oral, Nightly PRN    lisinopril 10 mg, oral, Daily    multivitamin tablet oral    niacin 500 mg tablet oral       Objective       Physical Exam  via [employer]  General: Well groomed, well nourished , speaks full sentences  Alert Cooperative , no apparent distress   Skin: Good color does not appear dehydrated   Eyes: Extra ocular muscle movements intact, anicteric sclerae  Neck: Supple, with good range of motion looking behind her and moving head sideways to cough   Neurological: Alert, oriented        Assessment/Plan   Yumiko Camilo is a 74 y.o. female who presents for the concerns below:    Problem List Items Addressed This Visit    None  HYPERTENSION PLAN:  , taking blood pressure medication unable to check her bp right now   Follow low salt diet, exercise as discussed, weight control. Continue current medications    BACK PAIN had surgery in Sept Dr Arana , did not need too much pain meds , going to PT [facility] seeing him twice a  week to [facility]  Uber ride a way , now cleared to drive short distances , did drive to [facility]  [place]   Rollator folding 14 lbs , they practiced it several times   Moved to summer  , neurosurgery   Stationary bike in her   Had the EMG test, the numbness is the problem no more pain   Doing the Slide/Glide exercise several times a day , no one in the building noone in the family, the  took her dog to her home while she was recovering   Now she can take her 7 lbs dog  back walk and now can bend over   Flu shot given Saturday HD   Discussed with:   Return in :  February 2025 after her ffup with neurosurgery     Portions of this note were generated using digital voice recognition software, and may contain grammatical errors       Babar Luna MD  11/18/24  10:31 AM

## 2024-11-27 ENCOUNTER — APPOINTMENT (OUTPATIENT)
Dept: RADIOLOGY | Facility: HOSPITAL | Age: 74
End: 2024-11-27
Payer: MEDICARE

## 2025-02-08 DIAGNOSIS — I10 HYPERTENSION, UNSPECIFIED TYPE: ICD-10-CM

## 2025-02-11 RX ORDER — LISINOPRIL 10 MG/1
10 TABLET ORAL DAILY
Qty: 90 TABLET | Refills: 1 | OUTPATIENT
Start: 2025-02-11

## 2025-02-12 ENCOUNTER — TELEPHONE (OUTPATIENT)
Dept: PRIMARY CARE | Facility: CLINIC | Age: 75
End: 2025-02-12
Payer: MEDICARE

## 2025-02-12 DIAGNOSIS — I10 HYPERTENSION, UNSPECIFIED TYPE: ICD-10-CM

## 2025-02-12 RX ORDER — LISINOPRIL 10 MG/1
10 TABLET ORAL DAILY
Qty: 90 TABLET | Refills: 1 | Status: SHIPPED | OUTPATIENT
Start: 2025-02-12

## 2025-02-12 NOTE — TELEPHONE ENCOUNTER
Pt received a message saying her Lisinopril refill was denied and she does not know why. I don't see any message telling me why so I'm not sure what to tell her.

## 2025-03-24 ENCOUNTER — HOSPITAL ENCOUNTER (OUTPATIENT)
Dept: RADIOLOGY | Facility: HOSPITAL | Age: 75
Discharge: HOME | End: 2025-03-24
Payer: MEDICARE

## 2025-03-24 VITALS — WEIGHT: 182.1 LBS | BODY MASS INDEX: 32.27 KG/M2 | HEIGHT: 63 IN

## 2025-03-24 DIAGNOSIS — Z17.0 MALIGNANT NEOPLASM OF RIGHT BREAST IN FEMALE, ESTROGEN RECEPTOR POSITIVE, UNSPECIFIED SITE OF BREAST: ICD-10-CM

## 2025-03-24 DIAGNOSIS — C50.411 MALIGNANT NEOPLASM OF UPPER-OUTER QUADRANT OF RIGHT FEMALE BREAST: ICD-10-CM

## 2025-03-24 DIAGNOSIS — C50.911 MALIGNANT NEOPLASM OF RIGHT BREAST IN FEMALE, ESTROGEN RECEPTOR POSITIVE, UNSPECIFIED SITE OF BREAST: ICD-10-CM

## 2025-03-24 PROCEDURE — 77062 BREAST TOMOSYNTHESIS BI: CPT

## 2025-03-27 ENCOUNTER — TELEPHONE (OUTPATIENT)
Dept: PRIMARY CARE | Facility: CLINIC | Age: 75
End: 2025-03-27
Payer: MEDICARE

## 2025-03-27 DIAGNOSIS — Z20.9 EXPOSURE TO COMMUNICABLE DISEASE: Primary | ICD-10-CM

## 2025-03-28 ENCOUNTER — HOSPITAL ENCOUNTER (OUTPATIENT)
Dept: RADIATION ONCOLOGY | Facility: CLINIC | Age: 75
Setting detail: RADIATION/ONCOLOGY SERIES
Discharge: HOME | End: 2025-03-28
Payer: MEDICARE

## 2025-03-28 VITALS
OXYGEN SATURATION: 94 % | TEMPERATURE: 98.4 F | HEART RATE: 85 BPM | DIASTOLIC BLOOD PRESSURE: 83 MMHG | SYSTOLIC BLOOD PRESSURE: 132 MMHG | BODY MASS INDEX: 33.9 KG/M2 | WEIGHT: 191.4 LBS | RESPIRATION RATE: 18 BRPM

## 2025-03-28 DIAGNOSIS — Z17.0 MALIGNANT NEOPLASM OF RIGHT BREAST IN FEMALE, ESTROGEN RECEPTOR POSITIVE, UNSPECIFIED SITE OF BREAST: ICD-10-CM

## 2025-03-28 DIAGNOSIS — C50.911 MALIGNANT NEOPLASM OF RIGHT BREAST IN FEMALE, ESTROGEN RECEPTOR POSITIVE, UNSPECIFIED SITE OF BREAST: ICD-10-CM

## 2025-03-28 DIAGNOSIS — Z12.31 ENCOUNTER FOR SCREENING MAMMOGRAM FOR MALIGNANT NEOPLASM OF BREAST: ICD-10-CM

## 2025-03-28 PROCEDURE — 99213 OFFICE O/P EST LOW 20 MIN: CPT | Performed by: NURSE PRACTITIONER

## 2025-03-28 ASSESSMENT — PAIN SCALES - GENERAL: PAINLEVEL_OUTOF10: 0-NO PAIN

## 2025-03-28 NOTE — PROGRESS NOTES
Radiation Oncology Follow-Up    Patient Name:  Yumiko Camilo  MRN:  [id number]  :  1950    Referring Provider: Mali Sprague, APR*  Primary Care Provider: Babar Luna MD  Care Team: Patient Care Team:  Babar Luna MD as PCP - General  Babar Luna MD [facility] PCP - [facility]ributed [facility]    Date of Service: 3/28/2025     Cancer Staging:   Treatment Synopsis:    74-year-old female with a history of T1b N0 M0 invasive lobular carcinoma of the right breast, estrogen receptor positive, progesterone receptor positive,  and HER-2 negative. She is status post partial mastectomy with clear margins and sentinel lymph node biopsy. Following surgery, she received radiation therapy to the whole breast consisting of a dose of 42.56 Gy given in 16 fractions, completed on 2016. She was initiated on anastrozole hormonal therapy post treatment; unable to tolerate anastrozole due to cognitive dysfunction and started on exemestane .    SUBJECTIVE  History of Present Illness:   Yumiko Camilo is here today for routine radiation follow up/surveillance visit.  She is doing very well. She had  back surgery for discectomy and root foraminotomy 2024 and currently getting PT.  She denies any palpable findings on breast self exam.  No swelling of right arm or difficulty with ROM.  No headaches, fever, chills, cough, SOB, chest pain or bony pain.  Energy level and appetite are good.  Mammogram without evidence of malignancy.    Review of Systems:    Review of Systems   All other systems reviewed and are negative.    Performance Status:   The Karnofsky performance scale today is 100, Fully active, able to carry on all pre-disease performed without restriction (ECOG equivalent 0).      OBJECTIVE    Current Outpatient Medications:     CALCIUM CITRATE-VITAMIN D3 ORAL, Take by mouth., Disp: , Rfl:     cholecalciferol (Vitamin D-3) 50 mcg (2,000 unit)  capsule, Take by mouth., Disp: , Rfl:     clobetasol (Temovate) 0.05 % cream, Sparingly to affected intact skin only twice a day, Disp: 30 g, Rfl: 0    cyclobenzaprine (Flexeril) 10 mg tablet, Take 1 tablet (10 mg) by mouth as needed at bedtime for muscle spasms., Disp: 30 tablet, Rfl: 0    lisinopril 10 mg tablet, Take 1 tablet (10 mg) by mouth once daily., Disp: 90 tablet, Rfl: 1    multivitamin tablet, Take by mouth., Disp: , Rfl:     niacin 500 mg tablet, Take by mouth., Disp: , Rfl:      Physical Exam  Constitutional:       Appearance: Normal appearance.   HENT:      Head: Normocephalic and atraumatic.      Nose: Nose normal.      Mouth/Throat:      Mouth: Mucous membranes are moist.      Pharynx: Oropharynx is clear.   Eyes:      Conjunctiva/sclera: Conjunctivae normal.      Pupils: Pupils are equal, round, and reactive to light.   Cardiovascular:      Rate and Rhythm: Normal rate.      Heart sounds: Normal heart sounds.   Pulmonary:      Effort: Pulmonary effort is normal.      Breath sounds: Normal breath sounds.   Chest:   Breasts:     Right: Normal. No swelling, inverted nipple, mass, nipple discharge or skin change.      Left: Normal. No swelling, inverted nipple, mass, nipple discharge or skin change.   Abdominal:      Palpations: Abdomen is soft.   Musculoskeletal:         General: No swelling. Normal range of motion.      Cervical back: Normal range of motion.   Lymphadenopathy:      Cervical: No cervical adenopathy.      Upper Body:      Right upper body: No supraclavicular or axillary adenopathy.      Left upper body: No supraclavicular or axillary adenopathy.   Skin:     General: Skin is warm and dry.   Neurological:      General: No focal deficit present.      Mental Status: She is alert and oriented to person, place, and time.   Psychiatric:         Mood and Affect: Mood normal.         Behavior: Behavior normal.     BI mammo bilateral diagnostic tomosynthesis    Result Date:  3/24/2025  Interpreted By:  Devan Álvarez, STUDY: BI MAMMO BILATERAL DIAGNOSTIC TOMOSYNTHESIS; 3/24/2025 8:16 am   ACCESSION NUMBER(S): [id number]   ORDERING CLINICIAN: BARRETT JEAN   INDICATION: Screening.   ,C50.911 Malignant neoplasm of unspecified site of right female breast,Z17.0 Estrogen receptor positive status (ER+),C50.411 Malignant neoplasm of upper-outer quadrant of right female breast   COMPARISON: 03/25/2024, 03/07/2020   FINDINGS: 2D and tomosynthesis images were reviewed at 1 mm slice thickness.   Density:  There are scattered areas of fibroglandular density.   Postsurgical and treatment changes are noted in the right breast. No suspicious masses or calcifications are identified.       No mammographic evidence of malignancy.   BI-RADS CATEGORY: BI-RADS Category:  1 Negative. Recommendation:  Annual Screening. Recommended Date:  1 Year. Laterality:  Bilateral.       For any future breast imaging appointments, please call [phone]).     MACRO: None   Signed by: Devan Álvarez 3/24/2025 12:16 PM Dictation workstation:   [id number]       ASSESSMENT/PLAN:  74 y.o. female with early stage right breast cancer s/p breast conserving surgery followed by radiation.  She is clinically and radiographically without evidence of  recurrence. Cosmetic outcome is excellent. Follow up in 1 yr with mammogram. She will call me with any concerns.       Mayra Jean CNP  [phone]

## 2025-03-31 LAB — MEV IGG SER IA-ACNC: >300 AU/ML

## 2025-05-22 ENCOUNTER — APPOINTMENT (OUTPATIENT)
Dept: PRIMARY CARE | Facility: CLINIC | Age: 75
End: 2025-05-22
Payer: MEDICARE

## 2025-05-22 VITALS — BODY MASS INDEX: 33.9 KG/M2 | HEIGHT: 63 IN | DIASTOLIC BLOOD PRESSURE: 78 MMHG | SYSTOLIC BLOOD PRESSURE: 124 MMHG

## 2025-05-22 DIAGNOSIS — Z13.228 SCREENING FOR ENDOCRINE, METABOLIC AND IMMUNITY DISORDER: ICD-10-CM

## 2025-05-22 DIAGNOSIS — Z00.00 MEDICARE ANNUAL WELLNESS VISIT, SUBSEQUENT: Primary | ICD-10-CM

## 2025-05-22 DIAGNOSIS — Z13.29 SCREENING FOR ENDOCRINE, METABOLIC AND IMMUNITY DISORDER: ICD-10-CM

## 2025-05-22 DIAGNOSIS — Z13.0 SCREENING FOR ENDOCRINE, METABOLIC AND IMMUNITY DISORDER: ICD-10-CM

## 2025-05-22 DIAGNOSIS — Z13.0 SCREENING FOR ENDOCRINE/METABOLIC/IMMUNITY DISORDERS: ICD-10-CM

## 2025-05-22 DIAGNOSIS — Z13.6 SCREENING FOR CARDIOVASCULAR CONDITION: ICD-10-CM

## 2025-05-22 DIAGNOSIS — Z13.228 SCREENING FOR ENDOCRINE/METABOLIC/IMMUNITY DISORDERS: ICD-10-CM

## 2025-05-22 DIAGNOSIS — Z78.0 ASYMPTOMATIC MENOPAUSAL STATE: ICD-10-CM

## 2025-05-22 DIAGNOSIS — E55.9 VITAMIN D DEFICIENCY: ICD-10-CM

## 2025-05-22 DIAGNOSIS — Z13.0 SCREENING FOR DEFICIENCY ANEMIA: ICD-10-CM

## 2025-05-22 DIAGNOSIS — M54.9 BACK PAIN, UNSPECIFIED BACK LOCATION, UNSPECIFIED BACK PAIN LATERALITY, UNSPECIFIED CHRONICITY: ICD-10-CM

## 2025-05-22 DIAGNOSIS — Z13.29 SCREENING FOR ENDOCRINE/METABOLIC/IMMUNITY DISORDERS: ICD-10-CM

## 2025-05-22 DIAGNOSIS — Z13.89 SCREENING FOR BLOOD OR PROTEIN IN URINE: ICD-10-CM

## 2025-05-22 DIAGNOSIS — I10 HYPERTENSION, UNSPECIFIED TYPE: ICD-10-CM

## 2025-05-22 DIAGNOSIS — R79.9 ABNORMAL BLOOD CHEMISTRY: ICD-10-CM

## 2025-05-22 PROCEDURE — 99214 OFFICE O/P EST MOD 30 MIN: CPT | Performed by: INTERNAL MEDICINE

## 2025-05-22 PROCEDURE — 1123F ACP DISCUSS/DSCN MKR DOCD: CPT | Performed by: INTERNAL MEDICINE

## 2025-05-22 PROCEDURE — 3078F DIAST BP <80 MM HG: CPT | Performed by: INTERNAL MEDICINE

## 2025-05-22 PROCEDURE — 1159F MED LIST DOCD IN RCRD: CPT | Performed by: INTERNAL MEDICINE

## 2025-05-22 PROCEDURE — 1170F FXNL STATUS ASSESSED: CPT | Performed by: INTERNAL MEDICINE

## 2025-05-22 PROCEDURE — 1158F ADVNC CARE PLAN TLK DOCD: CPT | Performed by: INTERNAL MEDICINE

## 2025-05-22 PROCEDURE — 3074F SYST BP LT 130 MM HG: CPT | Performed by: INTERNAL MEDICINE

## 2025-05-22 PROCEDURE — G0439 PPPS, SUBSEQ VISIT: HCPCS | Performed by: INTERNAL MEDICINE

## 2025-05-22 RX ORDER — LISINOPRIL 10 MG/1
10 TABLET ORAL DAILY
Qty: 90 TABLET | Refills: 1 | Status: SHIPPED | OUTPATIENT
Start: 2025-05-22 | End: 2025-05-23 | Stop reason: SDUPTHER

## 2025-05-22 ASSESSMENT — ACTIVITIES OF DAILY LIVING (ADL)
DOING_HOUSEWORK: INDEPENDENT
BATHING: INDEPENDENT
MANAGING_FINANCES: INDEPENDENT
TAKING_MEDICATION: INDEPENDENT
DRESSING: INDEPENDENT
GROCERY_SHOPPING: INDEPENDENT

## 2025-05-22 NOTE — PROGRESS NOTES
Subjective   Patient ID: Yumiko Camilo is a 74 y.o. female who presents for Medicare Annual Wellness Visit Subsequent.    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 Also is in for laboratory review and health maintenance update.  Updating family history as well.  Interval event - past medical history, surgical, social, and family history reviewed and updated.  Interval care -  Patient is    up to date with dental care.  Patient does     receive routine vision care.    Also here for [facility] Annual Wellness Visit     Review of Systems  General: Denies fever, chills, night sweats, changes in appetite or weight  ENT: Negative for ear pain, hearing loss, headache, difficulty swallowing, up to date with dental checks   Eyes: Negative for recent visual changes, up to date with eye exams  Dermatologic: Negative for new skin conditions, rash  Respiratory: Negative for paroxysmal nocturnal dyspnea, wheezing,shortness of breath, cough  Cardiovascular: Negative for chest pain, palpitations, or leg swelling  Gastrointestinal: Negative for nausea/vomiting, abdominal pain, changes in bowel habits  Genitourinary: Negative for dysuria, urgency, frequency  URINARY INCONTINENCE   Neurological: Negative for headaches, tremors, dizziness, memory loss, confusion, weakness, paresthesias  Psychiatric: Negative for sleep problems, anxiety, depression, conditions are stable  Endocrine: Negative for heat or cold intolerance, polyuria, polydipsia  Other:All systems have been reviewed and are negative except as previously noted.    Previous history  Medical History[1]  Surgical History[2]  Social History[3]  Family History[4]  Allergies[5]  Current Outpatient  Medications   Medication Instructions    CALCIUM CITRATE-VITAMIN D3 ORAL Take by mouth.    cholecalciferol (Vitamin D-3) 50 mcg (2,000 unit) capsule Take by mouth.    clobetasol (Temovate) 0.05 % cream Sparingly to affected intact skin only twice a day    cyclobenzaprine (FLEXERIL) 10 mg, oral, Nightly PRN    lisinopril 10 mg, oral, Daily    multivitamin tablet Take by mouth.    niacin 500 mg tablet Take by mouth.       Objective       Physical Exam  Vital Signs: as recorded above  General: Well groomed, well nourished   Orientation:  Alert , oriented to time, place , and person   Mood and Affect:  Cooperative , no apparent distress normal affect  Skin: Good color, good turgor  Eyes: Extra ocular muscle movements intact, anicteric sclerae  Ears:  Auditory canals clear , TMS intact   Neck: Supple, full range of movement  Chest: Normal breath sounds, normal chest wall exam, symmetric, good air entry, clear to auscultation  Heart: Regular rate and rhythm, without murmur, gallop, or rubs  Abdomen soft nontender no masses felt no hepatosplenomegaly, no rebound or guarding  BACK:  no CTLS spine tenderness, no flank tenderness  Extremities: full range of movement  bilateral UE and bilateral LE,  no lower extremity edema  Neurological: Alert, oriented, cranial nerves II-XII intact except for visual acuity  Sensation:  Intact   Gait: normal steady      Assessment/Plan   Yumiko Camilo is a 74 y.o. female who presents for the concerns below:    Problem List Items Addressed This Visit    None    HYPERTENSION PLAN:  , taking blood pressure medication  Follow low salt diet, exercise as discussed, weight control. Continue current medications    Right L4-L5 discectomy , root foraminectomies in the process of healing     Annual Wellness Visit  the risks and benefits of influenza vaccination were discussed with the patient, influenza vaccine is up to date this year  the risks and benefits of Prevnar 20  vaccination were discussed  with the patient, Prevnar 20 vaccine is    up to date  the risks and benefits of pneumonia vaccination were discussed with the patient, pneumonia vaccine is     up to date   the risks and benefits of  Shingrix  vaccination were discussed with the patient, Shingrix  vaccine is   not     up to date there is a shortage of the vaccine  the risks and benefits of tetanus vaccination were discussed with the patient, tetanus vaccine is      up to date   Cardiovascular screening and counseling the risk and benefits of screening were discussed counseling on maintaining a healthy diet and due for lipid panel  Breast cancer screening and counseling , the risks and benefits of screening were discussed with the patient, and screening is current     order is in  Cervical cancer screening and counseling , the risks and benefits of screening were discussed with the patient, and screening is current     order is in  Osteoporosis screening and counseling was given on obtaining adequate amounts of calcium and vitamin D on a daily basis and weight bearing exercise such as walking  , the risks and benefits of screening were discussed with the patient, and screening is current     order is in  Colorectal cancer screening and counseling; the risks and benefits of screening were discussed with the patient, colon cancer screening is     up to date , order is in   Abdominal aortic aneurysm screening and counseling,  the risks and benefits of screening were discussed with the patient, screening is not indicated   Visual acuity / Glaucoma screening and counseling , the risks and benefits of vision screening were discussed with the patient, screening is current   HIV screening and Counseling, the risks and benefits of screening were discussed with the patient, screening is not indicated   Advance directive planning : needs to be updated information forms given to patient .  complete and up  to date   Other Preventive Counseling Provided :  fall  risk reduction  seat belt use, sunscreen use , and increasing physical activity .  Patient Discussion:  plan discussed with the patient and follow-up visit needed          Discussed with:   Return in :    Portions of this note were generated using digital voice recognition software, and may contain grammatical errors       Babar Luna MD  05/22/25  11:49 AM       [1]   Past Medical History:  Diagnosis Date    Asymptomatic menopausal state     Asymptomatic menopausal state    Breast cancer ?    Cellulitis, unspecified 02/09/2016    Cellulitis    Contact with and (suspected) exposure to other hazardous, chiefly nonmedicinal, chemicals     Exposure to chemical irritant    Disorder of external ear, unspecified, unspecified ear 03/07/2022    Disorder of ear lobe    Disorder of the skin and subcutaneous tissue, unspecified     Foot lesion    Encounter for general adult medical examination without abnormal findings 03/03/2022    Encounter for Medicare annual wellness exam    Encounter for gynecological examination (general) (routine) without abnormal findings 04/25/2016    Well woman exam    Encounter for immunization 09/16/2020    Needs flu shot    Encounter for screening for malignant neoplasm of cervix 04/25/2016    Screening for cervical cancer    Fall (on)(from) sidewalk curb, initial encounter     Fall on or from sidewalk curb    Fracture of unspecified part of left clavicle, initial encounter for closed fracture     Closed left clavicular fracture    Mammographic calcification found on diagnostic imaging of breast 12/01/2015    Breast calcification, right    Mixed hyperlipidemia 11/22/2022    Combined hyperlipidemia    Obesity, unspecified 11/04/2021    Obesity    Other abnormal and inconclusive findings on diagnostic imaging of breast 11/30/2015    Mammogram abnormal    Other conditions influencing health status 02/04/2016    Postoperative seroma    Other injury of unspecified body region,  initial encounter 2022    Wound of skin    Other specified disorders of bone density and structure, unspecified site 2021    Osteopenia    Other specified personal risk factors, not elsewhere classified 2017    History of exposure    Pain in left shoulder 2021    Left shoulder pain    Personal history of (healed) traumatic fracture     History of fracture of toe    Personal history of irradiation ?    Personal history of malignant neoplasm of breast 2016    History of malignant neoplasm of breast    Personal history of other specified conditions 2017    History of fatigue    Personal history of traumatic brain injury     History of concussion    Post-traumatic stress disorder, unspecified     PTSD (post-traumatic stress disorder)    Unspecified contact dermatitis due to other chemical products 2018    Latex allergy, contact dermatitis    Unspecified contact dermatitis, unspecified cause 2022    Contact dermatitis   [2]   Past Surgical History:  Procedure Laterality Date    BACK SURGERY  2024    LAMNOTMY INCL W/DCMPRSN NRV ROOT 1 INTRSPC LUMBR  LAMINOTOMY W/ DECOMPRESSION OF NERVE ROOT(S) PARTIAL FACETECTOMY FORAMINOTOMY AND/OR EXCISION OF HERNIATED INTERVERTEBRAL DISC 1 INTERSPACE LUMBAR   DR BENAVIDEZ    BREAST BIOPSY Right 2015    BREAST LUMPECTOMY Right 2015    CATARACT EXTRACTION  2018    Cataract Surgery    MOUTH SURGERY  2018    Oral Surgery Tooth Extraction    OTHER SURGICAL HISTORY  2018    Surgically Induced  - By Dilation And Evacuation    SENTINEL LYMPH NODE BIOPSY  2018    [place] Lymph Node Biopsy   [3]   Social History  Tobacco Use    Smoking status: Former     Types: Cigarettes     Passive exposure: Never    Smokeless tobacco: Former   Vaping Use    Vaping status: Never Used   Substance Use Topics    Alcohol use: Yes     Alcohol/week: 1.0 standard drink of alcohol     Types: 1 Glasses of wine per week     Drug use: Never   [4]   Family History  Problem Relation Name Age of Onset    No Known Problems Sister      No Known Problems Brother      Breast cancer Maternal Grandmother Ivone 42   [5]   Allergies  Allergen Reactions    Bacitracin Zinc-Polymyxin B Hives    Penicillin Other    Adhesive Itching and Rash    Benzalkonium Chloride Rash    Elastin Rash    Latex Rash    Nickel Rash

## 2025-05-23 ENCOUNTER — TELEPHONE (OUTPATIENT)
Dept: PRIMARY CARE | Facility: CLINIC | Age: 75
End: 2025-05-23
Payer: MEDICARE

## 2025-05-23 DIAGNOSIS — I10 HYPERTENSION, UNSPECIFIED TYPE: Primary | ICD-10-CM

## 2025-05-23 DIAGNOSIS — R39.9 URINARY SYMPTOM OR SIGN: ICD-10-CM

## 2025-05-23 DIAGNOSIS — E55.9 VITAMIN D DEFICIENCY: ICD-10-CM

## 2025-05-23 DIAGNOSIS — R39.9 URINARY SYMPTOM OR SIGN: Primary | ICD-10-CM

## 2025-05-23 LAB
25(OH)D3+25(OH)D2 SERPL-MCNC: 29 NG/ML (ref 30–100)
ALBUMIN SERPL-MCNC: 4.5 G/DL (ref 3.6–5.1)
ALP SERPL-CCNC: 74 U/L (ref 37–153)
ALT SERPL-CCNC: 16 U/L (ref 6–29)
ANION GAP SERPL CALCULATED.4IONS-SCNC: 9 MMOL/L (CALC) (ref 7–17)
APPEARANCE UR: CLEAR
AST SERPL-CCNC: 20 U/L (ref 10–35)
BACTERIA #/AREA URNS HPF: ABNORMAL /HPF
BASOPHILS # BLD AUTO: 39 CELLS/UL (ref 0–200)
BASOPHILS NFR BLD AUTO: 0.8 %
BILIRUB SERPL-MCNC: 0.6 MG/DL (ref 0.2–1.2)
BILIRUB UR QL STRIP: NEGATIVE
BUN SERPL-MCNC: 20 MG/DL (ref 7–25)
CALCIUM SERPL-MCNC: 10 MG/DL (ref 8.6–10.4)
CHLORIDE SERPL-SCNC: 104 MMOL/L (ref 98–110)
CHOLEST SERPL-MCNC: 249 MG/DL
CHOLEST/HDLC SERPL: 3.4 (CALC)
CO2 SERPL-SCNC: 28 MMOL/L (ref 20–32)
COLOR UR: YELLOW
CREAT SERPL-MCNC: 0.79 MG/DL (ref 0.6–1)
EGFRCR SERPLBLD CKD-EPI 2021: 77 ML/MIN/1.73M2
EOSINOPHIL # BLD AUTO: 78 CELLS/UL (ref 15–500)
EOSINOPHIL NFR BLD AUTO: 1.6 %
ERYTHROCYTE [DISTWIDTH] IN BLOOD BY AUTOMATED COUNT: 13.4 % (ref 11–15)
EST. AVERAGE GLUCOSE BLD GHB EST-MCNC: 111 MG/DL
EST. AVERAGE GLUCOSE BLD GHB EST-SCNC: 6.2 MMOL/L
GLUCOSE SERPL-MCNC: 82 MG/DL (ref 65–99)
GLUCOSE UR QL STRIP: NEGATIVE
HBA1C MFR BLD: 5.5 %
HCT VFR BLD AUTO: 47.3 % (ref 35–45)
HDLC SERPL-MCNC: 73 MG/DL
HGB BLD-MCNC: 14.8 G/DL (ref 11.7–15.5)
HGB UR QL STRIP: NEGATIVE
HYALINE CASTS #/AREA URNS LPF: ABNORMAL /LPF
KETONES UR QL STRIP: ABNORMAL
LDLC SERPL CALC-MCNC: 158 MG/DL (CALC)
LEUKOCYTE ESTERASE UR QL STRIP: ABNORMAL
LYMPHOCYTES # BLD AUTO: 1132 CELLS/UL (ref 850–3900)
LYMPHOCYTES NFR BLD AUTO: 23.1 %
MCH RBC QN AUTO: 27.5 PG (ref 27–33)
MCHC RBC AUTO-ENTMCNC: 31.3 G/DL (ref 32–36)
MCV RBC AUTO: 87.9 FL (ref 80–100)
MONOCYTES # BLD AUTO: 446 CELLS/UL (ref 200–950)
MONOCYTES NFR BLD AUTO: 9.1 %
NEUTROPHILS # BLD AUTO: 3205 CELLS/UL (ref 1500–7800)
NEUTROPHILS NFR BLD AUTO: 65.4 %
NITRITE UR QL STRIP: NEGATIVE
NONHDLC SERPL-MCNC: 176 MG/DL (CALC)
PH UR STRIP: ABNORMAL [PH] (ref 5–8)
PLATELET # BLD AUTO: 253 THOUSAND/UL (ref 140–400)
PMV BLD REES-ECKER: 10.6 FL (ref 7.5–12.5)
POTASSIUM SERPL-SCNC: 4.8 MMOL/L (ref 3.5–5.3)
PROT SERPL-MCNC: 7 G/DL (ref 6.1–8.1)
PROT UR QL STRIP: NEGATIVE
RBC # BLD AUTO: 5.38 MILLION/UL (ref 3.8–5.1)
RBC #/AREA URNS HPF: ABNORMAL /HPF
SERVICE CMNT-IMP: ABNORMAL
SODIUM SERPL-SCNC: 141 MMOL/L (ref 135–146)
SP GR UR STRIP: 1.02 (ref 1–1.03)
SQUAMOUS #/AREA URNS HPF: ABNORMAL /HPF
TRIGL SERPL-MCNC: 74 MG/DL
TSH SERPL-ACNC: 1.85 MIU/L (ref 0.4–4.5)
WBC # BLD AUTO: 4.9 THOUSAND/UL (ref 3.8–10.8)
WBC #/AREA URNS HPF: ABNORMAL /HPF

## 2025-05-23 RX ORDER — NITROFURANTOIN 25; 75 MG/1; MG/1
100 CAPSULE ORAL 2 TIMES DAILY
Qty: 14 CAPSULE | Refills: 0 | Status: SHIPPED | OUTPATIENT
Start: 2025-05-23 | End: 2025-05-30

## 2025-05-23 RX ORDER — LISINOPRIL 10 MG/1
10 TABLET ORAL DAILY
Qty: 90 TABLET | Refills: 1 | Status: SHIPPED | OUTPATIENT
Start: 2025-05-23

## 2025-05-23 RX ORDER — CHOLECALCIFEROL (VITAMIN D3) 1250 MCG
1.25 TABLET ORAL
Qty: 12 TABLET | Refills: 0 | Status: SHIPPED | OUTPATIENT
Start: 2025-05-23

## 2025-05-23 RX ORDER — NITROFURANTOIN 25; 75 MG/1; MG/1
100 CAPSULE ORAL 2 TIMES DAILY
Qty: 14 CAPSULE | Refills: 0 | Status: SHIPPED | OUTPATIENT
Start: 2025-05-23 | End: 2025-05-23 | Stop reason: SDUPTHER

## 2025-05-29 ENCOUNTER — TELEPHONE (OUTPATIENT)
Dept: PRIMARY CARE | Facility: CLINIC | Age: 75
End: 2025-05-29
Payer: MEDICARE

## 2025-05-29 RX ORDER — CIPROFLOXACIN 500 MG/1
500 TABLET, FILM COATED ORAL 2 TIMES DAILY
Qty: 10 TABLET | Refills: 0 | Status: SHIPPED | OUTPATIENT
Start: 2025-05-29 | End: 2025-06-03

## 2025-05-29 NOTE — TELEPHONE ENCOUNTER
Patient called you gave her nitrofurantoin and it is not agreeing. With her body would like something else.

## 2025-07-30 DIAGNOSIS — I10 HYPERTENSION, UNSPECIFIED TYPE: ICD-10-CM

## 2025-07-30 RX ORDER — LISINOPRIL 10 MG/1
10 TABLET ORAL DAILY
Qty: 90 TABLET | Refills: 2 | Status: SHIPPED | OUTPATIENT
Start: 2025-07-30

## 2025-07-30 RX ORDER — LISINOPRIL 10 MG/1
10 TABLET ORAL DAILY
Qty: 90 TABLET | Refills: 1 | Status: CANCELLED | OUTPATIENT
Start: 2025-07-30